# Patient Record
Sex: FEMALE | Race: OTHER | Employment: UNEMPLOYED | URBAN - METROPOLITAN AREA
[De-identification: names, ages, dates, MRNs, and addresses within clinical notes are randomized per-mention and may not be internally consistent; named-entity substitution may affect disease eponyms.]

---

## 2020-08-05 ENCOUNTER — VIRTUAL VISIT (OUTPATIENT)
Dept: FAMILY MEDICINE CLINIC | Age: 55
End: 2020-08-05

## 2020-08-05 DIAGNOSIS — Z11.3 ROUTINE SCREENING FOR STI (SEXUALLY TRANSMITTED INFECTION): ICD-10-CM

## 2020-08-05 DIAGNOSIS — G89.4 CHRONIC PAIN SYNDROME: ICD-10-CM

## 2020-08-05 DIAGNOSIS — M06.9 RHEUMATOID ARTHRITIS, INVOLVING UNSPECIFIED SITE, UNSPECIFIED RHEUMATOID FACTOR PRESENCE: ICD-10-CM

## 2020-08-05 DIAGNOSIS — E03.9 ACQUIRED HYPOTHYROIDISM: Primary | ICD-10-CM

## 2020-08-05 DIAGNOSIS — R56.9 SEIZURES (HCC): ICD-10-CM

## 2020-08-05 DIAGNOSIS — F31.70 BIPOLAR DISORDER IN PARTIAL REMISSION, MOST RECENT EPISODE UNSPECIFIED TYPE (HCC): ICD-10-CM

## 2020-08-05 DIAGNOSIS — G47.33 OSA (OBSTRUCTIVE SLEEP APNEA): ICD-10-CM

## 2020-08-05 DIAGNOSIS — H91.93 BILATERAL HEARING LOSS, UNSPECIFIED HEARING LOSS TYPE: ICD-10-CM

## 2020-08-05 DIAGNOSIS — E78.5 HYPERLIPIDEMIA, UNSPECIFIED HYPERLIPIDEMIA TYPE: ICD-10-CM

## 2020-08-05 DIAGNOSIS — F17.200 CURRENT EVERY DAY SMOKER: ICD-10-CM

## 2020-08-05 DIAGNOSIS — D64.9 ANEMIA, UNSPECIFIED TYPE: ICD-10-CM

## 2020-08-05 DIAGNOSIS — J45.40 MODERATE PERSISTENT ASTHMA WITHOUT COMPLICATION: ICD-10-CM

## 2020-08-05 DIAGNOSIS — R00.1 BRADYCARDIA: ICD-10-CM

## 2020-08-05 PROBLEM — I00 RHEUMATIC ARTERITIS: Status: ACTIVE | Noted: 2020-08-05

## 2020-08-05 PROCEDURE — 99203 OFFICE O/P NEW LOW 30 MIN: CPT | Performed by: FAMILY MEDICINE

## 2020-08-05 RX ORDER — LEVOTHYROXINE SODIUM 137 UG/1
137 TABLET ORAL
COMMUNITY
End: 2020-08-26 | Stop reason: SDUPTHER

## 2020-08-05 RX ORDER — OXYCODONE AND ACETAMINOPHEN 10; 325 MG/1; MG/1
TABLET ORAL
COMMUNITY
End: 2020-08-26 | Stop reason: SDUPTHER

## 2020-08-05 RX ORDER — ATORVASTATIN CALCIUM 40 MG/1
TABLET, FILM COATED ORAL DAILY
COMMUNITY
End: 2020-08-26 | Stop reason: SDUPTHER

## 2020-08-05 RX ORDER — DIVALPROEX SODIUM 500 MG/1
1000 TABLET, DELAYED RELEASE ORAL 2 TIMES DAILY
COMMUNITY
End: 2020-08-26 | Stop reason: SDUPTHER

## 2020-08-05 RX ORDER — ARIPIPRAZOLE 15 MG/1
15 TABLET ORAL DAILY
COMMUNITY
End: 2020-08-26 | Stop reason: SDUPTHER
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
## 2020-08-05 NOTE — Clinical Note
If possible, can this patient please be scheduled for an in office visit with me soon? Hopefully the next 1-2 weeks. Will need 30 minutes. Thanks!

## 2020-08-26 ENCOUNTER — OFFICE VISIT (OUTPATIENT)
Dept: FAMILY MEDICINE CLINIC | Age: 55
End: 2020-08-26

## 2020-08-26 VITALS
DIASTOLIC BLOOD PRESSURE: 74 MMHG | SYSTOLIC BLOOD PRESSURE: 146 MMHG | RESPIRATION RATE: 20 BRPM | BODY MASS INDEX: 28.38 KG/M2 | WEIGHT: 166.25 LBS | HEART RATE: 77 BPM | TEMPERATURE: 98.7 F | OXYGEN SATURATION: 100 % | HEIGHT: 64 IN

## 2020-08-26 DIAGNOSIS — E78.5 HYPERLIPIDEMIA, UNSPECIFIED HYPERLIPIDEMIA TYPE: ICD-10-CM

## 2020-08-26 DIAGNOSIS — G89.4 CHRONIC PAIN SYNDROME: ICD-10-CM

## 2020-08-26 DIAGNOSIS — J45.40 MODERATE PERSISTENT ASTHMA WITHOUT COMPLICATION: ICD-10-CM

## 2020-08-26 DIAGNOSIS — E03.9 ACQUIRED HYPOTHYROIDISM: Primary | ICD-10-CM

## 2020-08-26 DIAGNOSIS — R56.9 SEIZURES (HCC): ICD-10-CM

## 2020-08-26 DIAGNOSIS — K21.9 GASTROESOPHAGEAL REFLUX DISEASE, ESOPHAGITIS PRESENCE NOT SPECIFIED: ICD-10-CM

## 2020-08-26 DIAGNOSIS — Z12.39 BREAST CANCER SCREENING: ICD-10-CM

## 2020-08-26 DIAGNOSIS — F31.70 BIPOLAR DISORDER IN PARTIAL REMISSION, MOST RECENT EPISODE UNSPECIFIED TYPE (HCC): ICD-10-CM

## 2020-08-26 DIAGNOSIS — F17.200 CURRENT EVERY DAY SMOKER: ICD-10-CM

## 2020-08-26 LAB
ALBUMIN SERPL-MCNC: 3.6 G/DL (ref 3.5–5)
ALBUMIN/GLOB SERPL: 0.9 {RATIO} (ref 1.1–2.2)
ALP SERPL-CCNC: 97 U/L (ref 45–117)
ALT SERPL-CCNC: 14 U/L (ref 12–78)
ANION GAP SERPL CALC-SCNC: 3 MMOL/L (ref 5–15)
AST SERPL-CCNC: 9 U/L (ref 15–37)
BILIRUB SERPL-MCNC: 0.4 MG/DL (ref 0.2–1)
BUN SERPL-MCNC: 7 MG/DL (ref 6–20)
BUN/CREAT SERPL: 10 (ref 12–20)
CALCIUM SERPL-MCNC: 9.9 MG/DL (ref 8.5–10.1)
CHLORIDE SERPL-SCNC: 107 MMOL/L (ref 97–108)
CHOLEST SERPL-MCNC: 269 MG/DL
CO2 SERPL-SCNC: 31 MMOL/L (ref 21–32)
CREAT SERPL-MCNC: 0.72 MG/DL (ref 0.55–1.02)
ERYTHROCYTE [DISTWIDTH] IN BLOOD BY AUTOMATED COUNT: 15.3 % (ref 11.5–14.5)
GLOBULIN SER CALC-MCNC: 4.2 G/DL (ref 2–4)
GLUCOSE SERPL-MCNC: 72 MG/DL (ref 65–100)
HCT VFR BLD AUTO: 41.7 % (ref 35–47)
HDLC SERPL-MCNC: 44 MG/DL
HDLC SERPL: 6.1 {RATIO} (ref 0–5)
HGB BLD-MCNC: 12.6 G/DL (ref 11.5–16)
LDLC SERPL CALC-MCNC: 166.2 MG/DL (ref 0–100)
LIPID PROFILE,FLP: ABNORMAL
MCH RBC QN AUTO: 27.7 PG (ref 26–34)
MCHC RBC AUTO-ENTMCNC: 30.2 G/DL (ref 30–36.5)
MCV RBC AUTO: 91.6 FL (ref 80–99)
NRBC # BLD: 0 K/UL (ref 0–0.01)
NRBC BLD-RTO: 0 PER 100 WBC
PLATELET # BLD AUTO: 332 K/UL (ref 150–400)
PMV BLD AUTO: 11 FL (ref 8.9–12.9)
POTASSIUM SERPL-SCNC: 3.9 MMOL/L (ref 3.5–5.1)
PROT SERPL-MCNC: 7.8 G/DL (ref 6.4–8.2)
RBC # BLD AUTO: 4.55 M/UL (ref 3.8–5.2)
SODIUM SERPL-SCNC: 141 MMOL/L (ref 136–145)
TRIGL SERPL-MCNC: 294 MG/DL (ref ?–150)
TSH SERPL DL<=0.05 MIU/L-ACNC: 1.11 UIU/ML (ref 0.36–3.74)
VLDLC SERPL CALC-MCNC: 58.8 MG/DL
WBC # BLD AUTO: 12.9 K/UL (ref 3.6–11)

## 2020-08-26 PROCEDURE — 99214 OFFICE O/P EST MOD 30 MIN: CPT | Performed by: FAMILY MEDICINE

## 2020-08-26 RX ORDER — OXYCODONE AND ACETAMINOPHEN 10; 325 MG/1; MG/1
1 TABLET ORAL
Qty: 21 TAB | Refills: 0 | Status: SHIPPED | OUTPATIENT
Start: 2020-08-26 | End: 2020-09-02

## 2020-08-26 RX ORDER — ARIPIPRAZOLE 15 MG/1
15 TABLET ORAL DAILY
Qty: 90 TAB | Refills: 1 | Status: SHIPPED | OUTPATIENT
Start: 2020-08-26 | End: 2020-12-08 | Stop reason: SDUPTHER

## 2020-08-26 RX ORDER — DIVALPROEX SODIUM 500 MG/1
1000 TABLET, DELAYED RELEASE ORAL 2 TIMES DAILY
Qty: 120 TAB | Refills: 2 | Status: SHIPPED | OUTPATIENT
Start: 2020-08-26 | End: 2020-09-14 | Stop reason: SDUPTHER

## 2020-08-26 RX ORDER — TIZANIDINE 4 MG/1
TABLET ORAL
Qty: 60 TAB | Refills: 0 | Status: SHIPPED | OUTPATIENT
Start: 2020-08-26 | End: 2020-09-28

## 2020-08-26 RX ORDER — ATORVASTATIN CALCIUM 80 MG/1
40 TABLET, FILM COATED ORAL DAILY
Qty: 90 TAB | Refills: 3 | Status: SHIPPED | OUTPATIENT
Start: 2020-08-26 | End: 2021-04-25 | Stop reason: SDUPTHER

## 2020-08-26 RX ORDER — NALOXONE HYDROCHLORIDE 2 MG/.4ML
2 INJECTION, SOLUTION INTRAMUSCULAR; SUBCUTANEOUS
Qty: 1 DEVICE | Refills: 0 | Status: SHIPPED | OUTPATIENT
Start: 2020-08-26 | End: 2020-08-26

## 2020-08-26 RX ORDER — LEVOTHYROXINE SODIUM 137 UG/1
137 TABLET ORAL
Qty: 90 TAB | Refills: 1 | Status: SHIPPED | OUTPATIENT
Start: 2020-08-26 | End: 2021-04-25 | Stop reason: SDUPTHER

## 2020-08-26 RX ORDER — MONTELUKAST SODIUM 10 MG/1
10 TABLET ORAL DAILY
Qty: 90 TAB | Refills: 1 | Status: SHIPPED | OUTPATIENT
Start: 2020-08-26 | End: 2021-04-25 | Stop reason: SDUPTHER

## 2020-08-26 RX ORDER — PANTOPRAZOLE SODIUM 40 MG/1
40 TABLET, DELAYED RELEASE ORAL DAILY
Qty: 90 TAB | Refills: 1 | Status: SHIPPED | OUTPATIENT
Start: 2020-08-26 | End: 2021-04-25 | Stop reason: SDUPTHER

## 2020-08-26 RX ORDER — TOPIRAMATE 100 MG/1
100 TABLET, FILM COATED ORAL DAILY
Qty: 90 TAB | Refills: 1 | Status: SHIPPED | OUTPATIENT
Start: 2020-08-26 | End: 2021-04-25 | Stop reason: SDUPTHER

## 2020-08-26 NOTE — PROGRESS NOTES
Chief Complaint   Patient presents with   [address]     Patient presents to establish with physician; also medication refills. Vitals:    08/26/20 1009   BP: 146/74   BP 1 Location: Left arm   BP Patient Position: Sitting   Pulse: 77   Resp: 20   Temp: 98.7 °F (37.1 °C)   TempSrc: Temporal   SpO2: 100%   Weight: 166 lb 4 oz (75.4 kg)   Height: 5' 4\" (1.626 m)       1. Have you been to the ER, urgent care clinic since your last visit? Hospitalized since your last visit? No, this is her first visit here. 2. Have you seen or consulted any other health care providers outside of the [address] [place] [address] since your last visit? Include any pap smears or colon screening.  No

## 2020-08-26 NOTE — PROGRESS NOTES
History of Present Illness:     Chief Complaint   Patient presents with   [address]     Patient presents to establish with physician; also medication refills. Audelia Parkinson is a 48 y.o. female follow up for establishment of care. Problem list was reviewed extensively at prior VV and updated in chart. She reports she is all out of her medication and needs refills on everything. Reports she had issues with chest pain. She had a cardiology evaluation with stress test and said everything was normal. No records here. PMH (REVIEWED):  Past Medical History:   Diagnosis Date    Hypothyroid     Psychotic disorder ([facility]        Current Medications/Allergies (REVIEWED):     Current Outpatient Medications on File Prior to Visit   Medication Sig Dispense Refill    oxyCODONE ER (XTAMPZA ER) 9 mg capsule Take 9 mg by mouth every twelve (12) hours.  oxyCODONE-acetaminophen (Percocet)  mg per tablet Take  by mouth every six (6) hours as needed for Pain. No current facility-administered medications on file prior to visit.         Allergies   Allergen Reactions    Ibuprofen Swelling         Review of Systems:     Denies fever, chills, sweats  Denies chest pain, YOST, palpitations, LE edema  Denies cough, sputum production, SOB, pleuritic chest pain, wheezing  Denies n/v/d, constipation, melena, blood in stool  Denies numbness/ tingling/ weakness in extremities  +Pain all over body      Objective:     Vitals:    08/26/20 1009   BP: 146/74   Pulse: 77   Resp: 20   Temp: 98.7 °F (37.1 °C)   TempSrc: Temporal   SpO2: 100%   Weight: 166 lb 4 oz (75.4 kg)   Height: 5' 4\" (1.626 m)       Physical Exam:  General appearance - alert, well appearing, and in no distress  Mental status - alert, oriented to person, place, and time, depressed mood, affect appropriate to mood  Chest - clear to auscultation, no wheezes, rales or rhonchi, symmetric air entry  Heart - normal rate, regular rhythm, normal S1, S2, no murmurs, rubs, clicks or gallops  Neurological - alert, oriented, normal speech, no focal findings or movement disorder noted    Recent Labs:  No results found for this or any previous visit (from the past 12 hour(s)). Assessment and Plan:       ICD-10-CM ICD-9-CM    1. Acquired hypothyroidism  E03.9 244.9 CBC W/O DIFF      levothyroxine (SYNTHROID) 137 mcg tablet      TSH 3RD GENERATION   2. Seizures (HCC)  R56.9 780.39 divalproex DR (Depakote) 500 mg tablet      topiramate (TOPAMAX) 100 mg tablet   3. Bipolar disorder in partial remission, most recent episode unspecified type ([facility]  F31.70 296.80 ARIPiprazole (Abilify) 15 mg tablet      divalproex DR (Depakote) 500 mg tablet   4. Hyperlipidemia, unspecified hyperlipidemia type  E78.5 272.4 LIPID PANEL      METABOLIC PANEL, COMPREHENSIVE      atorvastatin (LIPITOR) 80 mg tablet   5. Moderate persistent asthma without complication  Q18.54 883.81 montelukast (SINGULAIR) 10 mg tablet   6. Current every day smoker  F17.200 305.1    7. Chronic pain syndrome  G89.4 338.4 tiZANidine (ZANAFLEX) 4 mg tablet   8. Breast cancer screening  Z12.39 V76.10 [facility] MAMMO BI SCREENING INCL CAD   9. Gastroesophageal reflux disease, esophagitis presence not specified  K21.9 530.81 pantoprazole (PROTONIX) 40 mg tablet     Refilled medications    Scheduled pain management appointment   Reviewed ; negative in [place] but pt recently moved from [place]  Provided with Narcan     This is a chronic problem that is not changed. Per review of available records and patients , there are not sign of overuse, misuse, diversion, or concerning side effects. Today we reviewed: the risk of overdose, addiction, and dependency alternative treatment options including non-narcotic modalities referral to Pain Management   The following changes were made to the patients current treatment plan: Referral to Pain Management  nothing, medications refilled. Follow up: 4 weeks.  Will contact patient about pain management visit. Idalmis Montilla MD    Time-based coding, delete if not needed: I spent at least 25 minutes with this established patient, and >50% of the time was spent counseling and/or coordinating care regarding arranging Pain Management visit, counseling on pain meds, review of chart and medications    We discussed the expected course, resolution and complications of the diagnosis(es) in detail. Medication risks, benefits, costs, interactions, and alternatives were discussed as indicated. I advised her to contact the office if her condition worsens, changes or fails to improve as anticipated. She expressed understanding with the diagnosis(es) and plan.

## 2020-08-26 NOTE — PATIENT INSTRUCTIONS

## 2020-08-27 NOTE — PROGRESS NOTES
Lipids elevated, Estimated ASCVD risk 13.3%  Recent non compliance with statin, refilled yesterda  Consider increased dose if pt has indeed been compliant  Remaining labs stable  Plan to rpt labs with lipid panel in 2-3 mo  Called and notified pt of result

## 2020-09-09 ENCOUNTER — HOSPITAL ENCOUNTER (OUTPATIENT)
Dept: MAMMOGRAPHY | Age: 55
Discharge: HOME | End: 2020-09-09
Attending: FAMILY MEDICINE
Payer: MEDICAID

## 2020-09-09 DIAGNOSIS — Z12.39 BREAST CANCER SCREENING: ICD-10-CM

## 2020-09-09 PROCEDURE — 77067 SCR MAMMO BI INCL CAD: CPT

## 2020-09-14 ENCOUNTER — TELEPHONE (OUTPATIENT)
Dept: FAMILY MEDICINE CLINIC | Age: 55
End: 2020-09-14

## 2020-09-14 DIAGNOSIS — R56.9 SEIZURES (HCC): ICD-10-CM

## 2020-09-14 DIAGNOSIS — F31.70 BIPOLAR DISORDER IN PARTIAL REMISSION, MOST RECENT EPISODE UNSPECIFIED TYPE (HCC): ICD-10-CM

## 2020-09-14 RX ORDER — DIVALPROEX SODIUM 500 MG/1
1000 TABLET, DELAYED RELEASE ORAL 2 TIMES DAILY
Qty: 120 TAB | Refills: 2 | Status: SHIPPED | OUTPATIENT
Start: 2020-09-14 | End: 2020-12-08 | Stop reason: SDUPTHER

## 2020-09-14 NOTE — TELEPHONE ENCOUNTER
Pt referred to pain management at [address] [place] [address]. Willing to fill meds once date for pain management visit confirmed. Will have nursing call to inquire.

## 2020-09-14 NOTE — TELEPHONE ENCOUNTER
----- Message from Austin Hua sent at 9/10/2020  9:52 AM EDT -----  Regarding: Dr. Etienne  telephone  Caller's first and last name and relationship to patient (if not the patient): self. Best contact number: [phone]  Preferred date and time: Cannot have week of 10/11-10/17. Reason for appointment: Pt was told Sophie Brown MD would be scheduling pt appts listed below. Details to clarify the request: Pt saw Sophie Brown 08/26, was told at appt she would have appts scheduled for Pain Management, Rheumatology, Psychiatric, ENT, and a new Sleep Apnea Machine. Emphasized having her Pain Management and Rheumatology ASAP. Also needs f/up scheduled w/ Dr Sophie Brown.

## 2020-09-14 NOTE — TELEPHONE ENCOUNTER
[phone]  Patient called for these refills as she will not have enough medication to last until she can get her referral appointments made.       Percocet--has four pills left and this medication is not on the current list.    Kathie Collins she also needs beto

## 2020-09-15 ENCOUNTER — TELEPHONE (OUTPATIENT)
Dept: FAMILY MEDICINE CLINIC | Age: 55
End: 2020-09-15

## 2020-09-15 DIAGNOSIS — G89.4 CHRONIC PAIN SYNDROME: Primary | ICD-10-CM

## 2020-09-15 DIAGNOSIS — F11.90 CHRONIC, CONTINUOUS USE OF OPIOIDS: ICD-10-CM

## 2020-09-15 NOTE — TELEPHONE ENCOUNTER
Appt scheduled Sept 22th [facility] ( [facility]) [facility], [facility] 
 
[phone] Pt aware

## 2020-09-17 RX ORDER — OXYCODONE AND ACETAMINOPHEN 10; 325 MG/1; MG/1
1 TABLET ORAL
Qty: 21 TAB | Refills: 0 | Status: SHIPPED | OUTPATIENT
Start: 2020-09-17 | End: 2020-09-24

## 2020-09-17 NOTE — TELEPHONE ENCOUNTER
Confirmed with nursing that patient has an appointment with pain management on September 22nd. Will refill pain meds for 7 days. Reviewed . Appears that pt filled the Percocet script but not the Oxycodone ER. Will have pt called with information.   
 
Roxy Rose MD

## 2020-09-18 ENCOUNTER — TELEPHONE (OUTPATIENT)
Dept: FAMILY MEDICINE CLINIC | Age: 55
End: 2020-09-18

## 2020-09-18 NOTE — TELEPHONE ENCOUNTER
----- Message from Gabriella Powers sent at 9/16/2020  3:38 PM EDT -----  Regarding: Dr. Kaba Pac (if not patient): Self  Relationship of caller (if not patient):  Best contact number(s): [phone]  Name of medication and dosage if known: \"tadalafil\" 5 mg qty 90  Is patient out of this medication (yes/no): yes  Pharmacy name: [facility] listed in chart? (yes/no): Yes  Pharmacy phone number: [phone] option 2   Date of last visit: 03/17/2020   Details to clarify the request: pt requesting a prior authorization Rx through [employer] [id number] option 2 , pharmacy stated they have requested prior authorization 2 times and have not heard a response, for \"tadalafil\" 5 mg qty 90, pt requests a callback at [phone] regarding prior authorization status

## 2020-09-22 ENCOUNTER — TELEPHONE (OUTPATIENT)
Dept: FAMILY MEDICINE CLINIC | Age: 55
End: 2020-09-22

## 2020-09-22 NOTE — TELEPHONE ENCOUNTER
Patient appointment 9/30/2020 with   Dr. Lg Kamara, patient wants to know does she need to keep the appointment?

## 2020-09-24 NOTE — TELEPHONE ENCOUNTER
Advised patient per physician instructions.     Patient said thank you Dr. Darell Howard and that she is back on the right track, she saw the pain specialist, psychiatrist and had a mammogram.

## 2020-09-27 DIAGNOSIS — G89.4 CHRONIC PAIN SYNDROME: ICD-10-CM

## 2020-09-28 RX ORDER — TIZANIDINE 4 MG/1
TABLET ORAL
Qty: 60 TAB | Refills: 0 | Status: SHIPPED | OUTPATIENT
Start: 2020-09-28 | End: 2020-10-30

## 2020-10-06 ENCOUNTER — VIRTUAL VISIT (OUTPATIENT)
Dept: SLEEP MEDICINE | Age: 55
End: 2020-10-06
Payer: COMMERCIAL

## 2020-10-06 DIAGNOSIS — G47.33 OSA (OBSTRUCTIVE SLEEP APNEA): Primary | ICD-10-CM

## 2020-10-06 PROCEDURE — 99204 OFFICE O/P NEW MOD 45 MIN: CPT | Performed by: SPECIALIST

## 2020-10-06 NOTE — PATIENT INSTRUCTIONS

## 2020-10-06 NOTE — PROGRESS NOTES
Unreliably seeing you today                     [address] [place]. [place], [address]  Tel.  [phone]  Fax. [address]  [place], [address] [facility]  Tel.  [phone]  Fax. [phone] [address] [facility] Melissa [place]ribe   Tel.  [phone]  Fax. [phone]     Mukesh Mccormick is a 48 y.o. female who was seen by synchronous (real-time) audio-video technology on 10/6/2020. Consent:  She and/or her healthcare decision maker is aware that this patient-initiated Telehealth encounter is a billable service, with coverage as determined by her insurance carrier. She is aware that she may receive a bill and has provided verbal consent to proceed: Yes    I was in the office while conducting this encounter. Chief Complaint       No chief complaint on file. HPI      Mukesh Mccormick is 48 y.o. female seen for evaluation of a sleep disorder. She has a history of snoring, nocturnal awakening and daytime fatigue. She states that she was diagnosed with sleep apnea at [facility] which was started on CPAP. She is unable to provide more details in this regard. She states that she was able to use CPAP only 3-4 times during the week. She noted multiple problems including tolerating the CPAP pressure, headgear and mask. She notes retiring at 910 PM and awakening at 5:55 AM.  She will awaken several times during the night. She notes snoring, associated apnea, sitting up in bed while still asleep, leg twitching/kicking, waking with a gasp or snort and nightmares. She denies sleep walking or sleep talking, bruxism or nocturnal incontinence, abnormal arm movements, hypnagogic hallucinations, sleep paralysis or cataplexy. She notes she may doze if she is seated and inactive such as reading or watching TV.       Allergies   Allergen Reactions    Ibuprofen Swelling       Current Outpatient Medications   Medication Sig Dispense Refill    tiZANidine (ZANAFLEX) 4 mg tablet TAKE 1 TABLET BY MOUTH TWICE DAILY AS NEEDED FOR MUSCLE SPASM 60 Tab 0    divalproex DR (Depakote) 500 mg tablet Take 2 Tabs by mouth two (2) times a day for 90 days. 120 Tab 2    ARIPiprazole (Abilify) 15 mg tablet Take 1 Tab by mouth daily. 90 Tab 1    atorvastatin (LIPITOR) 80 mg tablet Take 0.5 Tabs by mouth daily. 90 Tab 3    levothyroxine (SYNTHROID) 137 mcg tablet Take 137 mcg by mouth Daily (before breakfast). 90 Tab 1    pantoprazole (PROTONIX) 40 mg tablet Take 1 Tab by mouth daily. 90 Tab 1    topiramate (TOPAMAX) 100 mg tablet Take 1 Tab by mouth daily. 90 Tab 1    montelukast (SINGULAIR) 10 mg tablet Take 1 Tab by mouth daily. 90 Tab 1        She  has a past medical history of Hypothyroid and Psychotic disorder ([address] She  has a past surgical history that includes hx carpal tunnel release; hx tumor removal; hx hysterectomy; and hx bladder suspension. She family history includes Diabetes in her mother; Heart Attack (age of onset: 48) in her father; Heart Attack (age of onset: 54) in her mother; Hypertension in her mother; Psychiatric Disorder in her father; Seizures in her father. She  reports that she has been smoking cigarettes. She has never used smokeless tobacco. She reports current alcohol use. She reports current drug use. Frequency: 7.00 times per week. Drug: Marijuana. Review of Systems:  ROS    [place] Sleepiness Scale: 7  Modified FOSQ: 13.5    Due to this being a telemedicine evaluation, certain elements of the physical examination are unable to be assessed. Objective:     Height: 5'4\"   Weight: 166 lb  BMI: 28.5  General:   Conversant, cooperative   Eyes:  no nystagmus   Oropharynx:  tongue scallope                   Skin:   no obvious rashes   Neuro:  Speech fluent, face symmetrical, tongue movement normal   Psych:  Normal affect,  normal countenance        Assessment:       ICD-10-CM ICD-9-CM    1.  AIDE (obstructive sleep apnea) G47.33 327.23        History of sleep disorder breathing. Copy of the sleep study and [facility]. Those will be reviewed with the patient. Plan:     No orders of the defined types were placed in this encounter. * Patient has a history and examination consistent with the diagnosis of sleep apnea. * She was provided information on sleep apnea including corresponding risk factors and the importance of proper treatment. * Treatment options if indicated were reviewed today. Instructions:  o The patient would benefit from weight reduction measures. o Do not engage in activities requiring a normal degree of alertness if fatigue is present. o The patient understands that untreated or undertreated sleep apnea could impair judgement and the ability to function normally during the day.  o Call or return if symptoms worsen or persist.          Ray Pro MD, [facility]  Electronically signed 10/06/20     Pursuant to the emergency declaration under the [facility], [facility] waiver authority and the [employer] Act, this Virtual  Visit was conducted, with patient's consent, to reduce the patient's risk of exposure to COVID-19 and provide continuity of care for an established patient. Services were provided through a video synchronous discussion virtually to substitute for in-person clinic visit. Wili Priest MD     Visit duration: 14 minutes      This note was created using voice recognition software. Despite editing, there may be syntax errors. This note will not be viewable in [address]

## 2020-10-09 NOTE — PROGRESS NOTES
Mammogram with: Negative. No mammographic evidence of malignancy. The next mammogram is indicated in one year.

## 2020-10-13 ENCOUNTER — TELEPHONE (OUTPATIENT)
Dept: SLEEP MEDICINE | Age: 55
End: 2020-10-13

## 2020-10-13 DIAGNOSIS — G47.33 OSA (OBSTRUCTIVE SLEEP APNEA): Primary | ICD-10-CM

## 2020-10-13 NOTE — TELEPHONE ENCOUNTER
Records from [facility] demonstrated response to CPAP of 8 and 10 cm. Diagnostic portion not included. Order entered for HSAT.

## 2020-10-30 DIAGNOSIS — G89.4 CHRONIC PAIN SYNDROME: ICD-10-CM

## 2020-10-30 RX ORDER — TIZANIDINE 4 MG/1
TABLET ORAL
Qty: 60 TAB | Refills: 0 | Status: SHIPPED | OUTPATIENT
Start: 2020-10-30 | End: 2021-04-25 | Stop reason: SDUPTHER

## 2020-11-03 ENCOUNTER — TELEPHONE (OUTPATIENT)
Dept: FAMILY MEDICINE CLINIC | Age: 55
End: 2020-11-03

## 2020-11-03 DIAGNOSIS — M25.531 RIGHT WRIST PAIN: Primary | ICD-10-CM

## 2020-11-03 RX ORDER — ARM BRACE
EACH MISCELLANEOUS
Qty: 1 EACH | Refills: 0 | Status: SHIPPED | OUTPATIENT
Start: 2020-11-03 | End: 2021-09-03

## 2020-11-03 NOTE — TELEPHONE ENCOUNTER
Per pt call, asking if you can prescribe RX for brace for right wrist due to her carpal tunnel. She states the cold is making her wrist hurt.       Please call/ [phone] (

## 2020-11-05 ENCOUNTER — APPOINTMENT (OUTPATIENT)
Dept: GENERAL RADIOLOGY | Age: 55
End: 2020-11-05
Attending: PHYSICIAN ASSISTANT
Payer: COMMERCIAL

## 2020-11-05 ENCOUNTER — HOSPITAL ENCOUNTER (EMERGENCY)
Age: 55
Discharge: HOME | End: 2020-11-05
Attending: EMERGENCY MEDICINE
Payer: COMMERCIAL

## 2020-11-05 VITALS
OXYGEN SATURATION: 98 % | RESPIRATION RATE: 18 BRPM | DIASTOLIC BLOOD PRESSURE: 74 MMHG | HEIGHT: 65 IN | SYSTOLIC BLOOD PRESSURE: 149 MMHG | WEIGHT: 165 LBS | HEART RATE: 69 BPM | BODY MASS INDEX: 27.49 KG/M2 | TEMPERATURE: 97.7 F

## 2020-11-05 DIAGNOSIS — M79.89 PAIN AND SWELLING OF TOE OF LEFT FOOT: Primary | ICD-10-CM

## 2020-11-05 DIAGNOSIS — M79.675 PAIN AND SWELLING OF TOE OF LEFT FOOT: Primary | ICD-10-CM

## 2020-11-05 PROCEDURE — 99282 EMERGENCY DEPT VISIT SF MDM: CPT

## 2020-11-05 PROCEDURE — 73630 X-RAY EXAM OF FOOT: CPT

## 2020-11-05 NOTE — ED PROVIDER NOTES
28-year-old female with past medical history of hypothyroidism and psychotic disorder presents to ED due to left toe pain after \"stubbing my toes on the curb 2 days ago \". Patient states at first the pain was moderate but woke her out of her sleep last night and was throbbing. Patient takes medication for chronic pain and did take 1 Percocet which helped her pain. She is allergic to ibuprofen. Patient states that it is the second toe of her left foot, looks kind of swollen and red today. Denies pain of any other toes, denies other foot pain, ankle pain, knee pain. Patient did not fall at the time that she stubbed her toe.            Past Medical History:   Diagnosis Date    Hypothyroid     Psychotic disorder ([facility] [address]        Past Surgical History:   Procedure Laterality Date    HX BLADDER SUSPENSION      HX CARPAL TUNNEL RELEASE      x 3 on right hand    HX HYSTERECTOMY      No idea what was left behind, for fibroids    HX TUMOR REMOVAL      As a child, right side of neck         Family History:   Problem Relation Age of Onset    Heart Attack Mother 54    Diabetes Mother     Hypertension Mother     Heart Attack Father 48    Seizures Father     Psychiatric Disorder Father        Social History     Socioeconomic History    Marital status: SINGLE     Spouse name: Not on file    Number of children: Not on file    Years of education: Not on file    Highest education level: Not on file   Occupational History    Not on file   Social Needs    Financial resource strain: Not on file    Food insecurity     Worry: Not on file     Inability: Not on file    Transportation needs     Medical: Not on file     Non-medical: Not on file   Tobacco Use    Smoking status: Current Every Day Smoker     Types: Cigarettes    Smokeless tobacco: Never Used   Substance and Sexual Activity    Alcohol use: Yes     Frequency: Monthly or less     Drinks per session: 1 or 2    Drug use: Yes     Frequency: 7.0 times per week Types: Marijuana    Sexual activity: Yes     Partners: Female, Male     Birth control/protection: Condom   Lifestyle    Physical activity     Days per week: Not on file     Minutes per session: Not on file    Stress: Not on file   Relationships    Social connections     Talks on phone: Not on file     Gets together: Not on file     Attends [employer] service: Not on file     Active member of club or organization: Not on file     Attends meetings of clubs or organizations: Not on file     Relationship status: Not on file    Intimate partner violence     Fear of current or ex partner: Not on file     Emotionally abused: Not on file     Physically abused: Not on file     Forced sexual activity: Not on file   Other Topics Concern    Not on file   Social History Narrative    Not on file         ALLERGIES: Ibuprofen    Review of Systems   Constitutional: Negative for fever. HENT: Negative for congestion and sore throat. Respiratory: Negative for cough and shortness of breath. Cardiovascular: Negative for chest pain. Gastrointestinal: Negative for nausea and vomiting. Genitourinary: Negative for dysuria. Musculoskeletal: Positive for arthralgias (left second toe pain). Negative for myalgias. Skin: Negative for rash. Neurological: Negative for dizziness and weakness. Vitals:    11/05/20 0900   BP: (!) 149/74   Pulse: 69   Resp: 18   Temp: 97.7 °F (36.5 °C)   SpO2: 98%   Weight: 74.8 kg (165 lb)   Height: 5' 5\" (1.651 m)            Physical Exam  Vitals signs and nursing note reviewed. Constitutional:       General: She is not in acute distress. HENT:      Head: Normocephalic. Nose: Nose normal.      Mouth/Throat:      Mouth: Mucous membranes are moist.   Eyes:      Extraocular Movements: Extraocular movements intact. Neck:      Musculoskeletal: Normal range of motion. Pulmonary:      Effort: Pulmonary effort is normal. No respiratory distress.    Musculoskeletal: Feet:    Skin:     General: Skin is dry. Findings: No rash. Neurological:      Mental Status: She is alert and oriented to person, place, and time. Psychiatric:         Mood and Affect: Mood normal.        Medications - No data to display  Labs Reviewed - No data to display  XR FOOT LT MIN 3 V   Final Result   IMPRESSION: No acute abnormality. MDM  Number of Diagnoses or Management Options  Pain and swelling of toe of left foot:      Amount and/or Complexity of Data Reviewed  Tests in the radiology section of CPT®: reviewed      Differential diagnosis includes acute fracture, ligament sprain, neurovascular injury    Xray reveals no acute fracture, patient has difficulty ambulating due to pain and is limping significantly.  Will provided with post-op shoe to assist with ambulation    Discussed care with ice and ortho follow-up as needed    Return precautions reviewed           Procedures    Darwin Merlos PA-C  11/5/2020

## 2020-11-05 NOTE — DISCHARGE INSTRUCTIONS
Patient Education     Use ice, 20 minutes on followed by 20 minutes off, throughout the day to help with pain and swelling. Wear the post-op shoe until you can walk without limping to prevent secondary injury. Follow-up with orthopedics if your pain does not improve. Bones of the Foot: Anatomy Sketch    Current as of: March 2, 2020               Content Version: 12.6  © 5541-0502 [employer], [email or web address]. Care instructions adapted under license by [employer] (which disclaims liability or warranty for this information). If you have questions about a medical condition or this instruction, always ask your healthcare professional. [place] [address] any warranty or liability for your use of this information.

## 2020-11-06 ENCOUNTER — PATIENT OUTREACH (OUTPATIENT)
Dept: CASE MANAGEMENT | Age: 55
End: 2020-11-06

## 2020-11-06 NOTE — PROGRESS NOTES
Patient contacted regarding recent discharge and COVID-19 risk. Discussed COVID-19 related testing which was not done at this time. Test results were not done. Patient informed of results, if available?     Care Transition Nurse/ Ambulatory Care Manager/ LPN Care Coordinator contacted the patient by telephone to perform post discharge assessment. Verified name and  with patient as identifiers. Patient has following risk factors of: asthma. CTN/ACM/LPN reviewed discharge instructions, medical action plan and red flags related to discharge diagnosis. Reviewed and educated them on any new and changed medications related to discharge diagnosis. Advised obtaining a 90-day supply of all daily and as-needed medications. Advance Care Planning:   Does patient have an Advance Directive: currently not on file; patient declined education    Education provided regarding infection prevention, and signs and symptoms of COVID-19 and when to seek medical attention with patient who verbalized understanding. Discussed exposure protocols and quarantine from [address] you at higher risk for severe illness  and given an opportunity for questions and concerns. The patient agrees to contact the COVID-19 hotline [phone] or PCP office for questions related to their healthcare. CTN/ACM/LPN provided contact information for future reference. From CDC: Are you at higher risk for severe illness?  Wash your hands often.  Avoid close contact (6 feet, which is about two arm lengths) with people who are sick.  Put distance between yourself and other people if COVID-19 is spreading in your community.  Clean and disinfect frequently touched surfaces.  Avoid all cruise travel and non-essential air travel.  Call your healthcare professional if you have concerns about COVID-19 and your underlying condition or if you are sick.     For more information on steps you can take to protect yourself, see CDC's How to Protect Yourself      Patient/family/caregiver given information for [facility] and agrees to enroll no  Patient's preferred e-mail:  na  Patient's preferred phone number: na  Based on Loop alert triggers, patient will be contacted by nurse care manager for worsening symptoms. Plan for follow-up call in 7-14 days based on severity of symptoms and risk factors. Patient denies any s/sx COVID 19 a this time. States Her toe \"is getting better. \"    Patient informs CTN that she is getting ready to go out of town to visit family for 1 month. CTN advised her of COVID 19 precautions while visiting and to wear a mask and social distance 6 feet for 14 days to prevent inadvertent spread of infection. Patient verbalizes understanding.

## 2020-11-16 ENCOUNTER — TELEPHONE (OUTPATIENT)
Dept: FAMILY MEDICINE CLINIC | Age: 55
End: 2020-11-16

## 2020-11-16 NOTE — TELEPHONE ENCOUNTER
----- Message from Armani Howard sent at 11/16/2020  8:07 AM EST -----  Regarding: /Level 1  Level 1/Escalated Issue      Caller's first and last name and relationship (if not the patient): Self       Best contact number(s): [phone]      What are the symptoms:Really Dark/black stool, Dizziness      Transfer successful - yes/no (include outcome): No      Transfer declined - yes/no (include reason): Yes, back line nurse hung up on me when I called at 8:07 am.     Was caller advised to seek appropriate level of care - yes/no: Yes       Details to clarify the request: patient is complaining of Really Dark/black stool and Dizziness, advised patient to seek appropriate medical care, declined, tried to schedule an appointment, declined,  would like a nurse call at [phone].         Armani Howard

## 2020-11-16 NOTE — TELEPHONE ENCOUNTER
Returned call to pt. Pt notes she's out of town which she mentioned to call center. Spoke with nurse Jonathon VASQUEZ for advice about matter. Pt scheduled for VV for 1st available of tomorrow with Dr. Kimberly Mazariegos last morning appt .

## 2020-11-17 ENCOUNTER — VIRTUAL VISIT (OUTPATIENT)
Dept: FAMILY MEDICINE CLINIC | Age: 55
End: 2020-11-17
Payer: COMMERCIAL

## 2020-11-17 DIAGNOSIS — R42 DIZZINESS: ICD-10-CM

## 2020-11-17 DIAGNOSIS — K92.1 MELENA: Primary | ICD-10-CM

## 2020-11-17 PROCEDURE — 99214 OFFICE O/P EST MOD 30 MIN: CPT | Performed by: STUDENT IN AN ORGANIZED HEALTH CARE EDUCATION/TRAINING PROGRAM

## 2020-11-17 NOTE — PROGRESS NOTES
[address] Medicine Residency Attending Addendum:  Dr. Katelyn Clark MD,  the patient and I were not physically present during this encounter. The resident and I are concurrently monitoring the patient care through appropriate telecommunication technology. I discussed the findings, assessment and plan with the resident and agree with the resident's findings and plan as documented in the resident's note.       Oziel Claudio MD

## 2020-11-17 NOTE — PROGRESS NOTES
Harry Frederick  48 y.o. female  1965  [facility] [address]  [id number]   [address]:    Telemedicine Progress Note  Leonor Lopez MD       Encounter Date and Time: November 17, 2020 at 10:22 AM    Consent: Harry Frederick, who was seen by synchronous (real-time) audio-video technology, and/or her healthcare decision maker, is aware that this patient-initiated, Telehealth encounter on 11/17/2020 is a billable service, with coverage as determined by her insurance carrier. She is aware that she may receive a bill and has provided verbal consent to proceed: Yes. No chief complaint on file. History of Present Illness   Harry Frederick is a 48 y.o. female was evaluated by synchronous (real-time) audio-video technology from home, through a secure patient portal.    Patient has a history of hypothyroidism, psychotic disorder, and hemorrhoids. She presents for dark stools and dizziness. Patient is out of town. Reports that she developed dark, formed stools 4 days ago. Has had 2-3  episodes daily. Notes that her stools became black after 2 days but are back to being dark. Reports having bright red blood with wiping but that has resolved. Associated with intermittent, sharp suprapubic pain and dizziness with position changes. Eating and drinking ok. Patient has know history of hemorrhoids and saw GI in [facility] who told her she has gastritis. Has never had colonoscopy or EGD. Denies excessive use of NSAIDs. Denies chest pain, SOB, or palpitations. Review of Systems   Review of Systems   Constitutional: Negative for chills and fever. HENT: Negative for congestion and sore throat. Eyes: Negative for blurred vision and double vision. Respiratory: Negative for cough and shortness of breath. Cardiovascular: Negative for chest pain and palpitations. Gastrointestinal: Positive for abdominal pain. Negative for nausea and vomiting. Dark stool   Genitourinary: Negative for dysuria and urgency. Musculoskeletal: Negative for joint pain and myalgias. Neurological: Positive for dizziness. Negative for focal weakness and headaches. Vitals/Objective:     General: alert, cooperative, no distress   Mental  status: mental status: alert, oriented to person, place, and time, normal mood, behavior, speech, dress, motor activity, and thought processes   Resp: resp: normal effort and no respiratory distress   Neuro: neuro: no gross deficits   Skin: skin: no discoloration or lesions of concern on visible areas   Due to this being a TeleHealth evaluation, many elements of the physical examination are unable to be assessed. Assessment and Plan:   Time-based coding, delete if not needed: I spent at least 15 minutes with this established patient, and >50% of the time was spent counseling and/or coordinating care regarding above issue      Assessment/Plan:    Melena: Patient reports 2-3 episodes of dark stools over the last 4 days. Has history of hemorrhoids and gastritis. Has never had colonoscopy/EGD. Patient is out of town. Given patient has symptomatic GI bleed, advised her to go to urgent care or ED as soon as possible. She expressed understanding and agreed with the plan. Time spent in direct conversation with the patient to include medical condition(s) discussed, assessment and treatment plan:       We discussed the expected course, resolution and complications of the diagnosis(es) in detail. Medication risks, benefits, costs, interactions, and alternatives were discussed as indicated. I advised her to contact the office if her condition worsens, changes or fails to improve as anticipated. She expressed understanding with the diagnosis(es) and plan. Patient understands that this encounter was a temporary measure, and the importance of further follow up and examination was emphasized. Patient verbalized understanding.        Patient informed to follow up: ED for evaluation. Discussed with Dr. Johny Briones. Electronically Signed: Dione Carlos MD, Family Medicine Resident    CPT Codes 06028-67024 for Established Patients may apply to this Telehealth Visit. POS code: 18. Modifier GT    Sue Estrada is a 48 y.o. female who was evaluated by an audio-video encounter for concerns as above. Patient identification was verified prior to start of the visit. A caregiver was present when appropriate. Due to this being a TeleHealth encounter (During VECXN-32 public health emergency), evaluation of the following organ systems was limited: Vitals/Constitutional/EENT/Resp/CV/GI//MS/Neuro/Skin/Heme-Lymph-Imm. Pursuant to the emergency declaration under the [address] [place] [address], [facility] waiver authority and the [employer] and [employer] Act, this Virtual Visit was conducted, with patient's (and/or legal guardian's) consent, to reduce the patient's risk of exposure to COVID-19 and provide necessary medical care. Services were provided through a synchronous discussion virtually to substitute for in-person clinic visit. I was in the office. The patient was at home. History   Patients past medical, surgical and family histories were reviewed and updated.       Past Medical History:   Diagnosis Date    Hypothyroid     Psychotic disorder ([facility] [address]      Past Surgical History:   Procedure Laterality Date    HX BLADDER SUSPENSION      HX CARPAL TUNNEL RELEASE      x 3 on right hand    HX HYSTERECTOMY      No idea what was left behind, for fibroids    HX TUMOR REMOVAL      As a child, right side of neck     Family History   Problem Relation Age of Onset    Heart Attack Mother 54    Diabetes Mother     Hypertension Mother     Heart Attack Father 48    Seizures Father     Psychiatric Disorder Father      Social History     Socioeconomic History    Marital status: SINGLE     Spouse name: Not on file    Number of children: Not on file    Years of education: Not on file    Highest education level: Not on file   Occupational History    Not on file   Social Needs    Financial resource strain: Not on file    Food insecurity     Worry: Not on file     Inability: Not on file    Transportation needs     Medical: Not on file     Non-medical: Not on file   Tobacco Use    Smoking status: Current Every Day Smoker     Types: Cigarettes    Smokeless tobacco: Never Used   Substance and Sexual Activity    Alcohol use: Yes     Frequency: Monthly or less     Drinks per session: 1 or 2    Drug use: Yes     Frequency: 7.0 times per week     Types: Marijuana    Sexual activity: Yes     Partners: Female, Male     Birth control/protection: Condom   Lifestyle    Physical activity     Days per week: Not on file     Minutes per session: Not on file    Stress: Not on file   Relationships    Social connections     Talks on phone: Not on file     Gets together: Not on file     Attends [employer] service: Not on file     Active member of club or organization: Not on file     Attends meetings of clubs or organizations: Not on file     Relationship status: Not on file    Intimate partner violence     Fear of current or ex partner: Not on file     Emotionally abused: Not on file     Physically abused: Not on file     Forced sexual activity: Not on file   Other Topics Concern    Not on file   Social History Narrative    Not on file     Patient Active Problem List   Diagnosis Code    Obstructive sleep apnea syndrome G47.33    Acquired hypothyroidism E03.9    Bipolar disorder in partial remission ([facility] [address] F31.70    Seizures ([facility] [address] R56.9    Hyperlipidemia E78.5    Anemia D64.9    Bradycardia R00.1    Bilateral hearing loss H91.93    Rheumatic arteritis I00    Moderate persistent asthma without complication I87.44    Current every day smoker F17.200    Chronic pain syndrome G89.4          Current Medications/Allergies   Medications and Allergies reviewed:    Current Outpatient Medications   Medication Sig Dispense Refill    Arm Brace (Wrist Brace) misc Apply to right wrist as needed for pain 1 Each 0    tiZANidine (ZANAFLEX) 4 mg tablet TAKE 1 TABLET BY MOUTH TWICE DAILY AS NEEDED FOR MUSCLE SPASM 60 Tab 0    divalproex DR (Depakote) 500 mg tablet Take 2 Tabs by mouth two (2) times a day for 90 days. 120 Tab 2    ARIPiprazole (Abilify) 15 mg tablet Take 1 Tab by mouth daily. 90 Tab 1    atorvastatin (LIPITOR) 80 mg tablet Take 0.5 Tabs by mouth daily. 90 Tab 3    levothyroxine (SYNTHROID) 137 mcg tablet Take 137 mcg by mouth Daily (before breakfast). 90 Tab 1    pantoprazole (PROTONIX) 40 mg tablet Take 1 Tab by mouth daily. 90 Tab 1    topiramate (TOPAMAX) 100 mg tablet Take 1 Tab by mouth daily. 90 Tab 1    montelukast (SINGULAIR) 10 mg tablet Take 1 Tab by mouth daily.  90 Tab 1     Allergies   Allergen Reactions    Ibuprofen Swelling

## 2020-11-30 ENCOUNTER — PATIENT OUTREACH (OUTPATIENT)
Dept: CASE MANAGEMENT | Age: 55
End: 2020-11-30

## 2020-11-30 NOTE — PROGRESS NOTES
Patient resolved from Transition of Care episode on 11/27/2020. ACM/CTN was unsuccessful at contacting this patient today. Patient/family was provided the following resources and education related to COVID-19 during the initial call:                         Signs, symptoms and red flags related to COVID-19            CDC exposure and quarantine guidelines            [facility]it exposure contact - [phone]            Contact for their local Department of Health                 Patient has not had any additional ED or hospital visits. No further outreach scheduled with this CTN/ACM. Episode of Care resolved. Patient has this CTN/ACM contact information if future needs arise.

## 2020-12-08 ENCOUNTER — VIRTUAL VISIT (OUTPATIENT)
Dept: BEHAVIORAL/MENTAL HEALTH CLINIC | Age: 55
End: 2020-12-08
Payer: COMMERCIAL

## 2020-12-08 DIAGNOSIS — F31.70 BIPOLAR DISORDER IN PARTIAL REMISSION, MOST RECENT EPISODE UNSPECIFIED TYPE (HCC): ICD-10-CM

## 2020-12-08 DIAGNOSIS — Z51.81 MEDICATION MONITORING ENCOUNTER: Primary | ICD-10-CM

## 2020-12-08 DIAGNOSIS — R56.9 SEIZURES (HCC): ICD-10-CM

## 2020-12-08 PROCEDURE — 99214 OFFICE O/P EST MOD 30 MIN: CPT | Performed by: NURSE PRACTITIONER

## 2020-12-08 RX ORDER — DIVALPROEX SODIUM 500 MG/1
1000 TABLET, DELAYED RELEASE ORAL 2 TIMES DAILY
Qty: 360 TAB | Refills: 0 | Status: SHIPPED | OUTPATIENT
Start: 2020-12-08 | End: 2021-01-15 | Stop reason: SDUPTHER

## 2020-12-08 RX ORDER — ARIPIPRAZOLE 15 MG/1
15 TABLET ORAL DAILY
Qty: 90 TAB | Refills: 1 | Status: SHIPPED | OUTPATIENT
Start: 2020-12-08 | End: 2021-01-15 | Stop reason: SDUPTHER
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
## 2021-01-15 ENCOUNTER — VIRTUAL VISIT (OUTPATIENT)
Dept: BEHAVIORAL/MENTAL HEALTH CLINIC | Age: 56
End: 2021-01-15
Payer: COMMERCIAL

## 2021-01-15 DIAGNOSIS — R56.9 SEIZURES (HCC): ICD-10-CM

## 2021-01-15 DIAGNOSIS — F31.70 BIPOLAR DISORDER IN PARTIAL REMISSION, MOST RECENT EPISODE UNSPECIFIED TYPE (HCC): Primary | ICD-10-CM

## 2021-01-15 PROCEDURE — 99212 OFFICE O/P EST SF 10 MIN: CPT | Performed by: NURSE PRACTITIONER

## 2021-01-15 RX ORDER — DIVALPROEX SODIUM 500 MG/1
1000 TABLET, DELAYED RELEASE ORAL 2 TIMES DAILY
Qty: 360 TAB | Refills: 0 | Status: SHIPPED | OUTPATIENT
Start: 2021-01-15 | End: 2021-04-25 | Stop reason: SDUPTHER

## 2021-01-15 RX ORDER — ARIPIPRAZOLE 15 MG/1
15 TABLET ORAL DAILY
Qty: 90 TAB | Refills: 1 | Status: SHIPPED | OUTPATIENT
Start: 2021-01-15 | End: 2021-04-25 | Stop reason: SDUPTHER

## 2021-01-15 NOTE — PROGRESS NOTES
Claudia Mishra (: 1965) is a 48 y.o. female, established patient, here for evaluation of the following chief complaint(s):   Follow-up (\"I'm doing really good. \") and Medication Management       ASSESSMENT/PLAN:  1. Bipolar disorder in partial remission, most recent episode unspecified type (HCC)  -     ARIPiprazole (Abilify) 15 mg tablet; Take 1 Tab by mouth daily. , Normal, Disp-90 Tab, R-1  -     divalproex DR (Depakote) 500 mg tablet; Take 2 Tabs by mouth two (2) times a day for 90 days. , Normal, Disp-360 Tab, R-0    2. Seizures (HCC)  -     divalproex DR (Depakote) 500 mg tablet; Take 2 Tabs by mouth two (2) times a day for 90 days. , Normal, Disp-360 Tab, R-0        Return in about 3 months (around 4/15/2021), or if symptoms worsen or fail to improve. SUBJECTIVE/OBJECTIVE:  Ms. Serena Allred consents to virtual visit. Patient follows up in the clinic for bipolar disorder. She last visited the clinic 2020. Patient continues Depakote 500 mg tablet take 2 tablets twice daily and Abilify 15 mg tablet take 1 tablet daily. On today's visit, patient is requesting refills. Patient presents with euthymic mood. She is smiling and pleasant on interaction. Patient reports that she had a very good holiday because she spent time with her son and her 6 grandchildren in [place]. Patient reports this has been the best holiday season in a very long time. She reports fluctuating sleep and reduced appetite. Patient mentions that she has anemia and is waiting to hear back from her medical doctor regarding feeling lightheaded for the past week, she is expecting a call today. Patient denies hallucinations and none noted on interaction. No suicidal or homicidal thinking noted, risk for suicide is low, protective factor-family. Patient smokes half pack of cigarettes daily and denies alcohol use. She smokes marijuana regularly. Review of Systems   All other systems reviewed and are negative. Patient-Reported Vitals 1/15/2021   Patient-Reported Weight 178 pounds       Physical Exam  Psychiatric:         Attention and Perception: Attention and perception normal.         Mood and Affect: Mood and affect normal.         Speech: Speech normal.         Behavior: Behavior normal. Behavior is cooperative. Thought Content: Thought content normal.         Cognition and Memory: Cognition and memory normal.         Judgment: Judgment normal.         Plan:    Continue Depakote and Abilify as prescribed. Follow-up with medical provider as appropriate. Advised patient to avoid smoking, alcohol and drug use. Advised patient to call 911 or go to the emergency department for suicidal homicidal thinking. Patient may call the office for any issues. Jw Jimenez is being evaluated by a Virtual Visit (video visit) encounter to address concerns as mentioned above. A caregiver was present when appropriate. Due to this being a TeleHealth encounter (During [facility] public health emergency), evaluation of the following organ systems was limited: Vitals/Constitutional/EENT/Resp/CV/GI//MS/Neuro/Skin/Heme-Lymph-Imm. Pursuant to the emergency declaration under the [facility], [address] [place] [address] authority and the [employer] Act, this Virtual Visit was conducted with patient's (and/or legal guardian's) consent, to reduce the patient's risk of exposure to COVID-19 and provide necessary medical care. The patient (and/or legal guardian) has also been advised to contact this office for worsening conditions or problems, and seek emergency medical treatment and/or call 911 if deemed necessary. Patient identification was verified at the start of the visit: YES    Services were provided through a video synchronous discussion virtually to substitute for in-person clinic visit.  Patient was located at home and provider was located in office or at home. An electronic signature was used to authenticate this note.   -- Halley Gillespie NP

## 2021-01-18 ENCOUNTER — TELEPHONE (OUTPATIENT)
Dept: FAMILY MEDICINE CLINIC | Age: 56
End: 2021-01-18

## 2021-01-18 NOTE — TELEPHONE ENCOUNTER
Andrew message of 8:42 am of today 1/18. SPOKE WITH PATIENT who said she has been waiting for her doctor to call her and that she was dizzy the weekend and fell. Dr. Schwarz Ezequiel telephone  Received:  Today  Message Contents   Destinee Matthew [facility] Front Office             General Message/Vendor Calls     Caller's first and last name: Pt     Reason for call: Pt experiencing dizziness and light headedness     Callback required yes/no and why: yes, schedule VV     Best contact number(s): [phone]     Details to clarify the request: pt refusing to see alternative provider     Emmett Ocampo

## 2021-01-18 NOTE — TELEPHONE ENCOUNTER
----- Message from [place] sent at 1/15/2021  8:27 AM EST -----  Regarding: Dr. Jimy Rincon  Appointment not available    Caller's first and last name and relationship to patient (if not the patient):  n/a      Best contact number: [phone]      Preferred date and time:  first available      Scheduled appointment date and time:  n/a      Reason for appointment:  Follow Up and experiencing dizziness      Details to clarify the request:  [facility]

## 2021-01-18 NOTE — TELEPHONE ENCOUNTER
Spoke with patient in regards to dizziness. She states she is feeling dizzy when she stands up quickly and experienced a fall over the weekend d/t this. Advised patient to move slowing into different positions and appointment schedule. ER precautions given to patient.  Will forward to Dr. Michi Link per patient request.

## 2021-01-25 ENCOUNTER — OFFICE VISIT (OUTPATIENT)
Dept: FAMILY MEDICINE CLINIC | Age: 56
End: 2021-01-25
Payer: COMMERCIAL

## 2021-01-25 VITALS
HEART RATE: 59 BPM | DIASTOLIC BLOOD PRESSURE: 78 MMHG | OXYGEN SATURATION: 96 % | BODY MASS INDEX: 32.49 KG/M2 | TEMPERATURE: 97.1 F | RESPIRATION RATE: 18 BRPM | WEIGHT: 195 LBS | SYSTOLIC BLOOD PRESSURE: 120 MMHG | HEIGHT: 65 IN

## 2021-01-25 DIAGNOSIS — Z79.899 LONG TERM USE OF DRUG: ICD-10-CM

## 2021-01-25 DIAGNOSIS — Z13.1 DIABETES MELLITUS SCREENING: ICD-10-CM

## 2021-01-25 DIAGNOSIS — D64.9 ANEMIA, UNSPECIFIED TYPE: ICD-10-CM

## 2021-01-25 DIAGNOSIS — M06.9 RHEUMATOID ARTHRITIS INVOLVING BOTH HANDS, UNSPECIFIED WHETHER RHEUMATOID FACTOR PRESENT (HCC): ICD-10-CM

## 2021-01-25 DIAGNOSIS — R42 DIZZINESS: ICD-10-CM

## 2021-01-25 DIAGNOSIS — R42 DIZZINESS: Primary | ICD-10-CM

## 2021-01-25 LAB — HGB BLD-MCNC: 12 G/DL

## 2021-01-25 PROCEDURE — 85018 HEMOGLOBIN: CPT | Performed by: STUDENT IN AN ORGANIZED HEALTH CARE EDUCATION/TRAINING PROGRAM

## 2021-01-25 PROCEDURE — 99214 OFFICE O/P EST MOD 30 MIN: CPT | Performed by: STUDENT IN AN ORGANIZED HEALTH CARE EDUCATION/TRAINING PROGRAM

## 2021-01-25 PROCEDURE — 93000 ELECTROCARDIOGRAM COMPLETE: CPT | Performed by: STUDENT IN AN ORGANIZED HEALTH CARE EDUCATION/TRAINING PROGRAM

## 2021-01-25 RX ORDER — PREGABALIN 50 MG/1
50 CAPSULE ORAL EVERY 12 HOURS
COMMUNITY
End: 2021-09-03

## 2021-01-25 NOTE — PROGRESS NOTES
Identified pt with two pt identifiers(name and ). Reviewed record in preparation for visit and have obtained necessary documentation. Chief Complaint   Patient presents with    Dizziness     Has been experiencing dizziness x 2 weeks. Mily Franklin 1/15/21        Health Maintenance Due   Topic    Pneumococcal 0-64 years (1 of 1 - PPSV23)    COVID-19 Vaccine (1 of 2)    DTaP/Tdap/Td series (1 - Tdap)    PAP AKA CERVICAL CYTOLOGY     Shingrix Vaccine Age 50> (1 of 2)    Colorectal Cancer Screening Combo     Flu Vaccine (1)      Vitals:    21 1313 21 1407 21 1409 21 1410   BP: 111/71 133/76 130/79 120/78   BP 1 Location: Left arm Left arm Left arm Left arm   BP Patient Position: Sitting Supine Sitting Standing   Pulse: (!) 59 (!) 57 (!) 53 (!) 59   Resp: 18      Temp: 97.1 °F (36.2 °C)      TempSrc: Temporal      SpO2: 98% 99% 99% 96%   Weight: 195 lb (88.5 kg)      Height: 5' 5\" (1.651 m)            Coordination of Care Questionnaire:  :   1) Have you been to an emergency room, urgent care, or hospitalized since your last visit? If yes, where when, and reason for visit? no      2. Have seen or consulted any other health care provider since your last visit? If yes, where when, and reason for visit?   NO

## 2021-01-25 NOTE — PROGRESS NOTES
[address] [facility] [address] [place] [address]   Office [phone], Fax [phone]    Subjective:     Chief Complaint   Patient presents with    Dizziness     Has been experiencing dizziness x 2 weeks. Marlin Sox 1/15/21     History provided by patient     HPI:  Elizabeth Son is a 48 y.o. OTHER female with past medical history of Hypothyroidism, Psychotic Disorder, Hyperlipidemia, Seizures, and Smoking Use presents for   Chief Complaint   Patient presents with    Dizziness     Has been experiencing dizziness x 2 weeks. Marlin Sox 1/15/21       Ms. Angel Jackman has been suffering from dizziness for the past two weeks. It has recently worsened and she had a fall to her knees on 1/15/21. She did not hit her head or lose conciousness. She was eventually able to bring herself back to her feet on her own. She says that the dizziness comes and goes. Sometimes it happens when she gets up quickly. Sometimes it happens when she is twisting in bed or turning her head. She does not have palpiations or a history of Afib. She says that sometimes she feels like the room is spinning. She does not have chest pain, shortness of breath, focal weakness, headaches, blurry vision.     Social History     Socioeconomic History    Marital status: SINGLE     Spouse name: Not on file    Number of children: 2    Years of education: Not on file    Highest education level: GED or equivalent   Occupational History    Not on file   Social Needs    Financial resource strain: Hard    Food insecurity     Worry: Never true     Inability: Never true    Transportation needs     Medical: No     Non-medical: No   Tobacco Use    Smoking status: Current Every Day Smoker     Packs/day: 0.50     Types: Cigarettes    Smokeless tobacco: Never Used   Substance and Sexual Activity    Alcohol use: Not Currently     Frequency: Monthly or less     Drinks per session: 1 or 2    Drug use: Yes     Frequency: 7.0 times per week     Types: Marijuana    Sexual activity: Yes     Partners: Female, Male     Birth control/protection: Condom   Lifestyle    Physical activity     Days per week: Not on file     Minutes per session: Not on file    Stress: Not on file   Relationships    Social connections     Talks on phone: Not on file     Gets together: Not on file     Attends Jehovah's witness service: Not on file     Active member of club or organization: Not on file     Attends meetings of clubs or organizations: Not on file     Relationship status: Not on file    Intimate partner violence     Fear of current or ex partner: Not on file     Emotionally abused: Not on file     Physically abused: Not on file     Forced sexual activity: Not on file   Other Topics Concern    Not on file   Social History Narrative    Not on file     Review of Systems   HENT: Negative for hearing loss and tinnitus. Eyes: Negative for blurred vision. Respiratory: Negative for shortness of breath. Cardiovascular: Negative for chest pain and palpitations. Neurological: Positive for dizziness. Negative for focal weakness, loss of consciousness and headaches. Objective:     Visit Vitals  /78 (BP 1 Location: Left arm, BP Patient Position: Standing)   Pulse (!) 59   Temp 97.1 °F (36.2 °C) (Temporal)   Resp 18   Ht 5' 5\" (1.651 m)   Wt 195 lb (88.5 kg)   SpO2 96%   BMI 32.45 kg/m²      Older Vitals  1/25/21 2:07 PM  1/25/21 2:09 PM  1/25/21 2:10 PM     BP  133/76   130/79   120/78     BP 1 Location  Left arm   Left arm   Left arm     BP Patient Position  Supine   Sitting   Standing     Pulse  57   53   59     SpO2  99%   99%   96%      Physical Exam  Constitutional:       Appearance: Normal appearance. HENT:      Head: Normocephalic and atraumatic. Right Ear: Tympanic membrane normal.      Left Ear: Tympanic membrane normal.   Eyes:      Extraocular Movements: Extraocular movements intact. Pupils: Pupils are equal, round, and reactive to light.    Cardiovascular:      Rate and Rhythm: Normal rate and regular rhythm. Pulses: Normal pulses. Heart sounds: Normal heart sounds. Pulmonary:      Effort: Pulmonary effort is normal.      Breath sounds: Normal breath sounds. Abdominal:      General: Abdomen is flat. Bowel sounds are normal.      Palpations: Abdomen is soft. Neurological:      General: No focal deficit present. Mental Status: She is alert and oriented to person, place, and time. Cranial Nerves: No cranial nerve deficit. Sensory: No sensory deficit. Motor: No weakness. Comments: Positive Rita-Chilel Greeley       Pertinent Labs/Studies:   POC Hb - 12.0  POC EKG - Rate of 53, no evidence of ST elevation or depression, T-wave abnormality present, NV interval 194. Assessment and orders:       ICD-10-CM ICD-9-CM    1. Dizziness  R42 780.4 CBC WITH AUTOMATED DIFF      AMB POC EKG ROUTINE W/ 12 LEADS, INTER & REP      URINALYSIS W/ RFLX MICROSCOPIC      AMB POC HEMOGLOBIN (HGB)   2. Anemia, unspecified type  D64.9 285.9 CBC WITH AUTOMATED DIFF      AMB POC HEMOGLOBIN (HGB)   3. Diabetes mellitus screening  Z13.1 V77.1 HEMOGLOBIN A1C WITH EAG   4. Long term use of drug  Z14.841 K09.05 METABOLIC PANEL, COMPREHENSIVE   5. Rheumatoid arthritis involving both hands, unspecified whether rheumatoid factor present ([facility]  M06.9 714.0 REFERRAL TO RHEUMATOLOGY      CANCELED: ERICA, DIRECT, W/REFLEX     1. Dizziness: inner ear pathology vs cardiac abnormality vs lyrica use. Unlikely due to orthostatic blood pressure or dehydration or anemia because of blood pressure readings normal between positions and POC Hb of 12. - Referral to cardiology  - Obtain U/A  - Obtain CBC, CMP, HbA1c     Labs, imaging and immunization ordered as above    Follow Up: In 2 weeks    Pt was discussed with Dr. Herbert Munguia (attending physician). I have reviewed patient medical and social history and medications. I have reviewed pertinent labs results and other data.  I have discussed the diagnosis with the patient and the intended plan as seen in the above orders. The patient has received an after-visit summary and questions were answered concerning future plans. I have discussed medication side effects and warnings with the patient as well.     Edgardo Ventura MD  Resident [facility]  01/25/21

## 2021-01-25 NOTE — PATIENT INSTRUCTIONS
Dr. Rajan Read (Rheumatology): [phone]    Please call to schedule your appointment with provider/location then call our office back @ #[phone] to leave a message for our referral coordinator with the appointment information (date/time/providers full name) for whom you will be seeing for this referral order so that we can authorize your visit(s) with your insurance if needed and referral tracking purposes of this order. Anemia: Care Instructions  Your Care Instructions     Anemia is a low level of red blood cells, which carry oxygen throughout your body. Many things can cause anemia. Lack of iron is one of the most common causes. Your body needs iron to make hemoglobin, a substance in red blood cells that carries oxygen from the lungs to your body's cells. Without enough iron, the body produces fewer and smaller red blood cells. As a result, your body's cells do not get enough oxygen, and you feel tired and weak. And you may have trouble concentrating. Bleeding is the most common cause of a lack of iron. You may have heavy menstrual bleeding or bleeding caused by conditions such as ulcers, hemorrhoids, or cancer. Regular use of aspirin or other anti-inflammatory medicines (such as ibuprofen) also can cause bleeding in some people. A lack of iron in your diet also can cause anemia, especially at times when the body needs more iron, such as during pregnancy, infancy, and the teen years. Your doctor may have prescribed iron pills. It may take several months of treatment for your iron levels to return to normal. Your doctor also may suggest that you eat foods that are rich in iron, such as meat and beans. There are many other causes of anemia. It is not always due to a lack of iron. Finding the specific cause of your anemia will help your doctor find the right treatment for you. Follow-up care is a key part of your treatment and safety.  Be sure to make and go to all appointments, and call your doctor if you are having problems. It's also a good idea to know your test results and keep a list of the medicines you take. How can you care for yourself at home? · Take your medicines exactly as prescribed. Call your doctor if you think you are having a problem with your medicine. · If your doctor recommends iron pills, take them as directed:  ? Try to take the pills on an empty stomach about 1 hour before or 2 hours after meals. But you may need to take iron with food to avoid an upset stomach. ? Do not take antacids or drink milk or caffeine drinks (such as coffee, tea, or cola) at the same time or within 2 hours of the time that you take your iron. They can make it hard for your body to absorb the iron. ? Vitamin C (from food or supplements) helps your body absorb iron. Try taking iron pills with a glass of orange juice or some other food that is high in vitamin C, such as citrus fruits. ? Iron pills may cause stomach problems, such as heartburn, nausea, diarrhea, constipation, and cramps. Be sure to drink plenty of fluids, and include fruits, vegetables, and fiber in your diet each day. Iron pills often make your bowel movements dark or green. ? If you forget to take an iron pill, do not take a double dose of iron the next time you take a pill. ? Keep iron pills out of the reach of small children. An overdose of iron can be very dangerous. · Follow your doctor's advice about eating iron-rich foods. These include red meat, shellfish, poultry, eggs, beans, raisins, whole-grain bread, and leafy green vegetables. · Steam vegetables to help them keep their iron content. When should you call for help? Call 911 anytime you think you may need emergency care. For example, call if:    · You have symptoms of a heart attack. These may include:  ? Chest pain or pressure, or a strange feeling in the chest.  ? Sweating. ? Shortness of breath. ? Nausea or vomiting.   ? Pain, pressure, or a strange feeling in the back, neck, jaw, or upper belly or in one or both shoulders or arms. ? Lightheadedness or sudden weakness. ? A fast or irregular heartbeat. After you call 911, the  may tell you to chew 1 adult-strength or 2 to 4 low-dose aspirin. Wait for an ambulance. Do not try to drive yourself.     · You passed out (lost consciousness). Call your doctor now or seek immediate medical care if:    · You have new or increased shortness of breath.     · You are dizzy or lightheaded, or you feel like you may faint.     · Your fatigue and weakness continue or get worse.     · You have any abnormal bleeding, such as:  ? Nosebleeds. ? Vaginal bleeding that is different (heavier, more frequent, at a different time of the month) than what you are used to.  ? Bloody or black stools, or rectal bleeding. ? Bloody or pink urine. Watch closely for changes in your health, and be sure to contact your doctor if:    · You do not get better as expected. Where can you learn more? Go to http://www.noriega.com/  Enter R301 in the search box to learn more about \"Anemia: Care Instructions. \"  Current as of: November 8, 2019               Content Version: 12.6  © 9370-0500 [employer]. Care instructions adapted under license by [employer] (which disclaims liability or warranty for this information). If you have questions about a medical condition or this instruction, always ask your healthcare professional. [place] [address] any warranty or liability for your use of this information. Vertigo: Care Instructions  Your Care Instructions     Vertigo is the feeling that you or your surroundings are moving when there is no actual movement. It is often described as a feeling of spinning, whirling, falling, or tilting. Vertigo may make you vomit or feel nauseated. You may have trouble standing or walking and may lose your balance.   Vertigo is often related to an inner ear problem, but it can have other more serious causes. If vertigo continues, you may need more tests to find its cause. Follow-up care is a key part of your treatment and safety. Be sure to make and go to all appointments, and call your doctor if you are having problems. It's also a good idea to know your test results and keep a list of the medicines you take. How can you care for yourself at home? · Do not lie flat on your back. Prop yourself up slightly. This may reduce the spinning feeling. Keep your eyes open. · Move slowly so that you do not fall. · If your doctor recommends medicine, take it exactly as directed. · Do not drive while you are having vertigo. Certain exercises, called Brown-Daroff exercises, can help decrease vertigo. To do Brown-Daroff exercises:  · Sit on the edge of a bed or sofa and quickly lie down on the side that causes the worst vertigo. Lie on your side with your ear down. · Stay in this position for at least 30 seconds or until the vertigo goes away. · Sit up. If this causes vertigo, wait for it to stop. · Repeat the procedure on the other side. · Repeat this 10 times. Do these exercises 2 times a day until the vertigo is gone. When should you call for help? Call 911 anytime you think you may need emergency care. For example, call if:    · You passed out (lost consciousness).     · You have symptoms of a stroke. These may include:  ? Sudden numbness, tingling, weakness, or loss of movement in your face, arm, or leg, especially on only one side of your body. ? Sudden vision changes. ? Sudden trouble speaking. ? Sudden confusion or trouble understanding simple statements. ? Sudden problems with walking or balance. ? A sudden, severe headache that is different from past headaches. Call your doctor now or seek immediate medical care if:    · Vertigo occurs with a fever, a headache, or ringing in your ears.     · You have new or increased nausea and vomiting.    Watch closely for changes in your health, and be sure to contact your doctor if:    · Vertigo gets worse or happens more often.     · Vertigo has not gotten better after 2 weeks. Where can you learn more? Go to http://www.gray.com/  Enter A696 in the search box to learn more about \"Vertigo: Care Instructions. \"  Current as of: April 15, 2020               Content Version: 12.6  © 2006-2020 [employer]. Care instructions adapted under license by [employer] (which disclaims liability or warranty for this information). If you have questions about a medical condition or this instruction, always ask your healthcare professional. [place] [address] any warranty or liability for your use of this information. Dizziness: Care Instructions  Your Care Instructions  Dizziness is the feeling of unsteadiness or fuzziness in your head. It is different than having vertigo, which is a feeling that the room is spinning or that you are moving or falling. It is also different from lightheadedness, which is the feeling that you are about to faint. It can be hard to know what causes dizziness. Some people feel dizzy when they have migraine headaches. Sometimes bouts of flu can make you feel dizzy. Some medical conditions, such as heart problems or high blood pressure, can make you feel dizzy. Many medicines can cause dizziness, including medicines for high blood pressure, pain, or anxiety. If a medicine causes your symptoms, your doctor may recommend that you stop or change the medicine. If it is a problem with your heart, you may need medicine to help your heart work better. If there is no clear reason for your symptoms, your doctor may suggest watching and waiting for a while to see if the dizziness goes away on its own. Follow-up care is a key part of your treatment and safety. Be sure to make and go to all appointments, and call your doctor if you are having problems.  It's also a good idea to know your test results and keep a list of the medicines you take. How can you care for yourself at home? · If your doctor recommends or prescribes medicine, take it exactly as directed. Call your doctor if you think you are having a problem with your medicine. · Do not drive while you feel dizzy. · Try to prevent falls. Steps you can take include:  ? Using nonskid mats, adding grab bars near the tub, and using night-lights. ? Clearing your home so that walkways are free of anything you might trip on.  ? Letting family and friends know that you have been feeling dizzy. This will help them know how to help you. When should you call for help? Call 911 anytime you think you may need emergency care. For example, call if:    · You passed out (lost consciousness).     · You have dizziness along with symptoms of a heart attack. These may include:  ? Chest pain or pressure, or a strange feeling in the chest.  ? Sweating. ? Shortness of breath. ? Nausea or vomiting. ? Pain, pressure, or a strange feeling in the back, neck, jaw, or upper belly or in one or both shoulders or arms. ? Lightheadedness or sudden weakness. ? A fast or irregular heartbeat.     · You have symptoms of a stroke. These may include:  ? Sudden numbness, tingling, weakness, or loss of movement in your face, arm, or leg, especially on only one side of your body. ? Sudden vision changes. ? Sudden trouble speaking. ? Sudden confusion or trouble understanding simple statements. ? Sudden problems with walking or balance. ? A sudden, severe headache that is different from past headaches. Call your doctor now or seek immediate medical care if:    · You feel dizzy and have a fever, headache, or ringing in your ears.     · You have new or increased nausea and vomiting.     · Your dizziness does not go away or comes back.    Watch closely for changes in your health, and be sure to contact your doctor if:    · You do not get better as expected. Where can you learn more? Go to http://www.gray.com/  Enter Q823 in the search box to learn more about \"Dizziness: Care Instructions. \"  Current as of: June 26, 2019               Content Version: 12.6  © 2168-5779 [employer]. Care instructions adapted under license by [employer] (which disclaims liability or warranty for this information). If you have questions about a medical condition or this instruction, always ask your healthcare professional. [place] [address] any warranty or liability for your use of this information.

## 2021-01-26 LAB
ALBUMIN SERPL-MCNC: 4 G/DL (ref 3.5–5)
ALBUMIN/GLOB SERPL: 1.1 {RATIO} (ref 1.1–2.2)
ALP SERPL-CCNC: 102 U/L (ref 45–117)
ALT SERPL-CCNC: 18 U/L (ref 12–78)
ANION GAP SERPL CALC-SCNC: 2 MMOL/L (ref 5–15)
AST SERPL-CCNC: 17 U/L (ref 15–37)
BASOPHILS # BLD: 0 K/UL (ref 0–0.1)
BASOPHILS NFR BLD: 0 % (ref 0–1)
BILIRUB SERPL-MCNC: 0.3 MG/DL (ref 0.2–1)
BUN SERPL-MCNC: 13 MG/DL (ref 6–20)
BUN/CREAT SERPL: 17 (ref 12–20)
CALCIUM SERPL-MCNC: 9.9 MG/DL (ref 8.5–10.1)
CHLORIDE SERPL-SCNC: 110 MMOL/L (ref 97–108)
CO2 SERPL-SCNC: 30 MMOL/L (ref 21–32)
CREAT SERPL-MCNC: 0.77 MG/DL (ref 0.55–1.02)
DIFFERENTIAL METHOD BLD: ABNORMAL
EOSINOPHIL # BLD: 0.4 K/UL (ref 0–0.4)
EOSINOPHIL NFR BLD: 4 % (ref 0–7)
ERYTHROCYTE [DISTWIDTH] IN BLOOD BY AUTOMATED COUNT: 15.9 % (ref 11.5–14.5)
EST. AVERAGE GLUCOSE BLD GHB EST-MCNC: 117 MG/DL
GLOBULIN SER CALC-MCNC: 3.8 G/DL (ref 2–4)
GLUCOSE SERPL-MCNC: 83 MG/DL (ref 65–100)
HBA1C MFR BLD: 5.7 % (ref 4–5.6)
HCT VFR BLD AUTO: 40.8 % (ref 35–47)
HGB BLD-MCNC: 12.6 G/DL (ref 11.5–16)
IMM GRANULOCYTES # BLD AUTO: 0.1 K/UL (ref 0–0.04)
IMM GRANULOCYTES NFR BLD AUTO: 1 % (ref 0–0.5)
LYMPHOCYTES # BLD: 3.1 K/UL (ref 0.8–3.5)
LYMPHOCYTES NFR BLD: 30 % (ref 12–49)
MCH RBC QN AUTO: 28.4 PG (ref 26–34)
MCHC RBC AUTO-ENTMCNC: 30.9 G/DL (ref 30–36.5)
MCV RBC AUTO: 91.9 FL (ref 80–99)
MONOCYTES # BLD: 0.8 K/UL (ref 0–1)
MONOCYTES NFR BLD: 8 % (ref 5–13)
NEUTS SEG # BLD: 5.9 K/UL (ref 1.8–8)
NEUTS SEG NFR BLD: 57 % (ref 32–75)
NRBC # BLD: 0 K/UL (ref 0–0.01)
NRBC BLD-RTO: 0 PER 100 WBC
PLATELET # BLD AUTO: 328 K/UL (ref 150–400)
PMV BLD AUTO: 11 FL (ref 8.9–12.9)
POTASSIUM SERPL-SCNC: 4.1 MMOL/L (ref 3.5–5.1)
PROT SERPL-MCNC: 7.8 G/DL (ref 6.4–8.2)
RBC # BLD AUTO: 4.44 M/UL (ref 3.8–5.2)
SODIUM SERPL-SCNC: 142 MMOL/L (ref 136–145)
WBC # BLD AUTO: 10.2 K/UL (ref 3.6–11)

## 2021-01-27 ENCOUNTER — TELEPHONE (OUTPATIENT)
Dept: FAMILY MEDICINE CLINIC | Age: 56
End: 2021-01-27

## 2021-01-27 NOTE — TELEPHONE ENCOUNTER
Per pt call, appt is 4/5/21 at 11:00 am with below doctor        Procedure Modifiers Provider Requested Approved   [id number] - REFERRAL TO RHEUMATOLOGY None Yuriy Rogers MD 1 1   Diagnosis Information    Diagnosis   M06.9 (ICD-10-CM) - Rheumatoid arthritis involving both hands, unspecified whether rheumatoid factor present ([facility]         Office ph [phone]

## 2021-01-28 LAB
APPEARANCE UR: ABNORMAL
BACTERIA SPEC CULT: ABNORMAL
BACTERIA URNS QL MICRO: ABNORMAL /HPF
BILIRUB UR QL: NEGATIVE
CAOX CRY URNS QL MICRO: ABNORMAL
CC UR VC: ABNORMAL
COLOR UR: ABNORMAL
EPITH CASTS URNS QL MICRO: ABNORMAL /LPF
GLUCOSE UR STRIP.AUTO-MCNC: NEGATIVE MG/DL
HGB UR QL STRIP: ABNORMAL
KETONES UR QL STRIP.AUTO: NEGATIVE MG/DL
LEUKOCYTE ESTERASE UR QL STRIP.AUTO: ABNORMAL
NITRITE UR QL STRIP.AUTO: POSITIVE
PH UR STRIP: 5.5 [PH]
PROT UR STRIP-MCNC: ABNORMAL MG/DL
RBC #/AREA URNS HPF: ABNORMAL /HPF
SERVICE CMNT-IMP: ABNORMAL
SP GR UR REFRACTOMETRY: 1.02
UROBILINOGEN UR QL STRIP.AUTO: 1 EU/DL
WBC URNS QL MICRO: ABNORMAL /HPF

## 2021-01-28 NOTE — TELEPHONE ENCOUNTER
Updated Rheum ref order in [facility]    Close enc    Thanks  Abbey Jim S/PSR  [facility] Referral Coordinator

## 2021-04-02 ENCOUNTER — TELEPHONE (OUTPATIENT)
Dept: RHEUMATOLOGY | Age: 56
End: 2021-04-02

## 2021-04-05 ENCOUNTER — OFFICE VISIT (OUTPATIENT)
Dept: RHEUMATOLOGY | Age: 56
End: 2021-04-05

## 2021-04-05 VITALS
BODY MASS INDEX: 32.82 KG/M2 | HEIGHT: 65 IN | DIASTOLIC BLOOD PRESSURE: 83 MMHG | RESPIRATION RATE: 18 BRPM | SYSTOLIC BLOOD PRESSURE: 146 MMHG | HEART RATE: 63 BPM | WEIGHT: 197 LBS | TEMPERATURE: 97.7 F

## 2021-04-05 DIAGNOSIS — G89.29 CHRONIC RIGHT SHOULDER PAIN: ICD-10-CM

## 2021-04-05 DIAGNOSIS — M25.511 CHRONIC RIGHT SHOULDER PAIN: ICD-10-CM

## 2021-04-05 DIAGNOSIS — M25.50 POLYARTHRALGIA: Primary | ICD-10-CM

## 2021-04-05 PROCEDURE — 99204 OFFICE O/P NEW MOD 45 MIN: CPT | Performed by: INTERNAL MEDICINE

## 2021-04-05 NOTE — PATIENT INSTRUCTIONS
HAND OSTEOARTHRITIS. Osteoarthritis is also known as \"wear and tear arthritis,\" mechanical arthritis, or non-inflammatory arthritis. There are hereditary and vocational components and post-traumatic joint injuries may also predispose to it. It is not Rheumatoid Arthritis, however, patients with Rheumatoid Arthritis may also have osteoarthritis. I recommend: 
 
(1) Non-medicinal modalities such as keeping hands warm, avoid activities that case pain, warm water soaks 
 
(2) Oral medications, such as acetaminophen as first line (3000 mg maximum per day) and oral NSAIDs, such as naproxen (Aleve) two tablets of 220 mg twice daily with food, OR ibuprofen (Advil) two tablets of 200 mg three times daily, with food as second line therapy. Naproxen (Aleve) is associated with lower cardiovascular risk than other NSAIDs (Chong AMBROSE, Eyal SANTIAGO. Clinical Pharmacology and Cardiovascular Safety of Naproxen. Am J Cardiovasc Drugs. 2016 Nov 8). NSAIDs should not be used in patients on blood thinners, chronic kidney disease, high risk coronary artery disease, and inflammatory bowel disease (ulcerative colitis or Crohn's disease) 
 
(3) Topical medications, such as Capsaicin (which may cause a burning sensation) or NSAIDs, such as diclofenac gel (Voltaren gel), which is now over the counter. The combination of acetaminophen and NSAIDs are safe together. Please do not combine NSAIDs together, such as Aleve, Advil and Mobic (meloxicam) Please do hand ball squeezing and holding until your hand fatigues and then alternate to the next hand. Do that 10 times twice daily

## 2021-04-05 NOTE — PROGRESS NOTES
REASON FOR VISIT    This is the initial evaluation for Ms. Moiz Barboza a 48 y.o.  female for question of an inflammatory arthritis. The patient is referred to the [facility] at the request of Dr. Cecile Ayala. HISTORY OF PRESENT ILLNESS      I have reviewed and summarized old records from [address] [place], [place] [address]e E    In 2000, she developed chronic back and joint pain. In 1/25/2021, labs showed WBC 10.2, lymphocytes 3.1, Hct 40.8%, platelets 190,782, creatinine 0.77 mg/dL, eGFR >60, albumin 4.0 g/dL,  U/L, ALT 18 U/L, AST 17 U/L. Today, she complains of pain in her hands and that her joints are becoming deforming. This started 6/2020, when she moved from [place] to [place]. She feel her hand pain is worse in the morning or when the weather is cold or rainy. Sometimes, the pain is sharp but there will be a burning. The pain improves as the day goes on and then is worse at night. She notes intermittent swelling in her hands and has issues taking her rings off. She has morning stiffness lasting 20 minutes. Warm water helps. APAP does not help. She also complains of right shoulder pain that is chronic. She was referred to physical therapy which aggravated it more. She cannot take NSAIDs due to ibuprofen allergy. She reports having a right wrist surgery for carpal tunnel syndrome that was found to have cysts, so it was reconstructed. She is a smoker. She is disabled due to bipolar disorder. Therapy History includes:  Current DMARD therapy includes: none  Prior DMARD therapy includes: none  The following DMARDs have been ineffective: none  The following DMARDs were stopped because of side effects: none    REVIEW OF SYSTEMS    A 15 point review of systems was performed and summarized below. The questionnaire was reviewed with the patient and scanned into the patient's medical record.     General: endorses denies recent weight gain, recent weight loss, fatigue, weakness, fever, drenching night sweats  Musculoskeletal: endorses joint pain, morning stiffness (lasting 2- minutes), muscle pain, denies joint swelling  Ears: endorses denies ringing in ears, hearing loss, deafness  Eyes: endorses excess tearing, dryness, foreign body sensation, denies pain, light sensitive, redness, blindness, double vision, blurred vision  Mouth: denies sore tongue, oral ulcers, loss of taste, dryness, increased dental caries  Nose: denies nosebleeds, nasal ulcers  Throat: denies food stuck when swallowing, difficulty with swallowing, hoarseness, pain in jaw while chewing  Neck: denies swollen glands, tender glands  Cardiopulmonary: endorses wheezing, dry cough, shortness of breath on exertion,denies pain in chest with deep breaths, pain in chest when lying down, murmurs, sudden changes in heart beat, productive cough, shortness of breath at rest,  coughing of blood  Gastrointestinal: endorses chronic constipation, denies nausea, heartburn, stomach pain relieved by food, chronic diarrhea, blood in stools, black stools  Genitourinary: denies vaginal dryness, pain or burning on urination, blood in urine, cloudy urine, vaginal ulcers  Hematologic: endorses anemia, denies bleeding tendency, blood clots, bleeding gums  Skin: endorses easy bruising, hair loss, denies rash, rash worsened after sun exposure, hives/urticaria, skin thickening, skin tightness, nodules/bumps, color changes of hands or feet in the cold (Raynaud's)  Neurologic: endorses numbness or tingling in feet, denies numbness or tingling in hands, muscle weakness  Psychiatric: endorses depression, Bipolar, denies excessive worries, PTSD  Sleep: endorses poor sleep (5-6 hours), snoring, obstructive sleep apnea not on CPAP, daytime somnolence, difficulty falling asleep, difficulty staying asleep     PAST MEDICAL HISTORY    She has a past medical history of Hypothyroid and Psychotic disorder ([facility] [address]      FAMILY HISTORY    Her family history includes Arthritis-rheumatoid in her mother; Diabetes in her mother; Heart Attack (age of onset: 48) in her father; Heart Attack (age of onset: 54) in her mother; Hypertension in her mother; Other in her brother and sister; Psychiatric Disorder in her father; Seizures in her father. SOCIAL HISTORY    She reports that she has been smoking cigarettes. She has been smoking about 0.50 packs per day. She has never used smokeless tobacco. She reports current alcohol use. She reports current drug use. Frequency: 7.00 times per week. Drug: Marijuana. MEDICATIONS    Current Outpatient Medications   Medication Sig    albuterol sulfate (PROVENTIL IN) Take  by inhalation as needed.  pregabalin (Lyrica) 50 mg capsule Take 50 mg by mouth every twelve (12) hours.  ARIPiprazole (Abilify) 15 mg tablet Take 1 Tab by mouth daily.  divalproex DR (Depakote) 500 mg tablet Take 2 Tabs by mouth two (2) times a day for 90 days.  Arm Brace (Wrist Brace) misc Apply to right wrist as needed for pain    tiZANidine (ZANAFLEX) 4 mg tablet TAKE 1 TABLET BY MOUTH TWICE DAILY AS NEEDED FOR MUSCLE SPASM    levothyroxine (SYNTHROID) 137 mcg tablet Take 137 mcg by mouth Daily (before breakfast).  topiramate (TOPAMAX) 100 mg tablet Take 1 Tab by mouth daily.  montelukast (SINGULAIR) 10 mg tablet Take 1 Tab by mouth daily.  atorvastatin (LIPITOR) 80 mg tablet Take 0.5 Tabs by mouth daily. (Patient taking differently: Take 80 mg by mouth daily.)    pantoprazole (PROTONIX) 40 mg tablet Take 1 Tab by mouth daily. No current facility-administered medications for this visit. ALLERGIES  Allergies   Allergen Reactions    Ibuprofen Swelling       PHYSICAL EXAMINATION    Visit Vitals  BP (!) 146/83   Pulse 63   Temp 97.7 °F (36.5 °C)   Resp 18   Ht 5' 5\" (1.651 m)   Wt 197 lb (89.4 kg)   BMI 32.78 kg/m²     Body mass index is 32.78 kg/m².     General: Patient is alert, oriented x 3, not in acute distress    HEENT:   Conjunctiva are not injected and appear moist, there is no alopecia. Cardiovascular:  Heart is regular rate and rhythm, no murmurs. Chest:  Lungs are clear to auscultation bilaterally. Extremities:  Free of clubbing, cyanosis, edema, extremities well perfused. Neurological exam:  Muscle strength is full in upper and lower extremities. Skin exam:  There are no rashes, no tophi, no psoriasis, no active Raynaud's, no livedo reticularis, no periungual erythema. Musculoskeletal exam:  A comprehensive musculoskeletal exam was performed for all joints of each upper and lower extremity and assessed for swelling, tenderness and range of motion. Pertinent results are documented as below:    Bilateral Beatriz and Heberden nodes (most prominent on bilateral 5th DIP). Bilateral ankle tenderness    Z-Deformities:   no  Glidden Neck Deformities:  no  Boutonierre's Deformities:  no  Ulnar Deviation:   no  MCP Subluxation:  no    Joint Count 4/5/2021   Patient pain (0-100) 100   MHAQ 0.625   Left 2nd PIP - Tender 1   Left 2nd PIP - Swollen 0   Left 3rd PIP - Tender 1   Left 3rd PIP - Swollen 0   Left 4th PIP - Tender 1   Left 4th PIP - Swollen 0   Left 5th PIP - Tender 1   Left 5th PIP - Swollen 0   Right 2nd PIP - Tender 1   Right 2nd PIP - Swollen 0   Right 3rd PIP - Tender 1   Right 3rd PIP - Swollen 0   Right 4th PIP - Tender 1   Right 4th PIP - Swollen 0   Right 5th PIP - Tender 1   Right 5th PIP - Swollen 0   Tender Joint Count (Total) 8   Swollen Joint Count (Total) 0   Patient Assessment (0-10) 5       DATA REVIEW    Prior medical records were reviewed and are summarized as below:    Laboratory data: summarized in the HPI    Imaging: summarized in the HPI. ASSESSMENT AND PLAN    1) Bilateral Hand Osteoarthritis. The patient has osteoarthritis, which is also known as \"wear and tear arthritis,\" non-inflammatory arthritis or mechanical arthritis.  There are hereditary, vocational and posttraumatic joint injuries predisposing factors. I recommend non-pharmacologic modalities such as keeping hands warm, avoid activities that case pain, warm water soaks, in addition to (2) pharmacologic modalities such as acetaminophen as first line, oral and topical NSAIDs as second line, such as diclofenac (Voltaren gel) which is now over the counter. Naproxen is a low PALMER-2 selectivity inhibitor that poses lower cardiovascular risk than other NSAIDs (Angiofelecia DJ, Eyal SANTIAGO. Clinical Pharmacology and Cardiovascular Safety of Naproxen. Am J Cardiovasc Drugs. 2016 Nov 8). NSAIDs should not be used in patients on blood thinners, chronic kidney disease, high risk coronary artery disease, and inflammatory bowel disease (ulcerative colitis or Crohn's disease) I recommended ball squeezing and holding until hand fatigue then alternating to other hand exercises 10 times twice daily. I cannot rule out an inflammatory process since she feels symptoms in the morning that improves as the day goes on, but I did not appreciate synovitis on exam. She has tenderness along her PIPs which have overlying osteoarthritis changes. I ordered labs and radiographs and will review with her over MyChart. 2) Right Shoulder Pain. She declined an injection. I referred her to orthopedic per her request.    The patient voiced understanding of the aforementioned assessment and plan. Summary of plan was provided in the After Visit Summary patient instructions. I also provided education about MyChart setup and utility. A total of 48 minutes was spent on this visit, reviewing interval notes, interval testing results, ordering tests, refilling medications, documenting the findings in the note, patient education, counseling, and coordination of care as described above. All questions asked and answered.     TODAY'S ORDERS    Orders Placed This Encounter    QUANTIFERON-TB GOLD PLUS    XR HAND LT MIN 3 V    XR HAND RT MIN 3 V    XR FOOT LT MIN 3 V    XR FOOT RT MIN 3 V    CYCLIC CITRUL PEPTIDE AB, IGG    CBC WITH AUTOMATED DIFF    METABOLIC PANEL, COMPREHENSIVE    CHRONIC HEPATITIS PANEL    C REACTIVE PROTEIN, QT    SED RATE (ESR)    RHEUMATOID FACTOR BY TURB (RDL)    PROTEIN ELECTROPHORESIS W/ REFLX MELISA    REFERRAL TO ORTHOPEDICS     No future appointments.     Yue Wilde MD, [address] [facility]    Adult Rheumatology   Rheumatology Ultrasound Certified  [address]  [facility], [address] [facility], [address]   Phone [phone]  Fax [phone]    Patient Care Team:  Carlos Queen MD as PCP - General (Family Medicine)  Carlos Queen MD as PCP - [facility]

## 2021-04-05 NOTE — LETTER
4/5/2021 Patient: Tamika Guzmán YOB: 1965 Date of Visit: 4/5/2021 Dani Bynumret [phone] [address] [employer] Nancy Pulido MD 
Miranda Skelton [phone] [address] [employer] Dear MD Nancy Bynum MD, Thank you for referring Ms. Tamika Guzmán to [facility] for evaluation. My notes for this consultation are attached. If you have questions, please do not hesitate to call me. I look forward to following your patient along with you.  
 
 
Sincerely, 
 
Hafsa Shafer MD

## 2021-04-25 DIAGNOSIS — J45.40 MODERATE PERSISTENT ASTHMA WITHOUT COMPLICATION: ICD-10-CM

## 2021-04-25 DIAGNOSIS — R56.9 SEIZURES (HCC): ICD-10-CM

## 2021-04-25 DIAGNOSIS — G89.4 CHRONIC PAIN SYNDROME: ICD-10-CM

## 2021-04-25 DIAGNOSIS — E78.5 HYPERLIPIDEMIA, UNSPECIFIED HYPERLIPIDEMIA TYPE: ICD-10-CM

## 2021-04-25 DIAGNOSIS — F31.70 BIPOLAR DISORDER IN PARTIAL REMISSION, MOST RECENT EPISODE UNSPECIFIED TYPE (HCC): ICD-10-CM

## 2021-04-25 DIAGNOSIS — E03.9 ACQUIRED HYPOTHYROIDISM: ICD-10-CM

## 2021-04-25 RX ORDER — DIVALPROEX SODIUM 500 MG/1
1000 TABLET, DELAYED RELEASE ORAL 2 TIMES DAILY
Qty: 360 TAB | Refills: 0 | Status: SHIPPED | OUTPATIENT
Start: 2021-04-25 | End: 2021-07-24

## 2021-04-25 RX ORDER — ARIPIPRAZOLE 15 MG/1
15 TABLET ORAL DAILY
Qty: 90 TAB | Refills: 1 | Status: SHIPPED | OUTPATIENT
Start: 2021-04-25 | End: 2021-09-22 | Stop reason: SDUPTHER

## 2021-04-26 RX ORDER — MONTELUKAST SODIUM 10 MG/1
10 TABLET ORAL DAILY
Qty: 90 TAB | Refills: 1 | Status: SHIPPED | OUTPATIENT
Start: 2021-04-26 | End: 2021-09-03 | Stop reason: SDUPTHER

## 2021-04-26 RX ORDER — PANTOPRAZOLE SODIUM 40 MG/1
40 TABLET, DELAYED RELEASE ORAL DAILY
Qty: 90 TAB | Refills: 1 | Status: SHIPPED | OUTPATIENT
Start: 2021-04-26 | End: 2021-09-03 | Stop reason: SDUPTHER

## 2021-04-26 RX ORDER — TIZANIDINE 4 MG/1
4 TABLET ORAL
Qty: 60 TAB | Refills: 0 | Status: SHIPPED | OUTPATIENT
Start: 2021-04-26 | End: 2021-11-27 | Stop reason: ALTCHOICE

## 2021-04-26 RX ORDER — LEVOTHYROXINE SODIUM 137 UG/1
137 TABLET ORAL
Qty: 90 TAB | Refills: 1 | Status: SHIPPED | OUTPATIENT
Start: 2021-04-26 | End: 2021-10-04 | Stop reason: SDUPTHER

## 2021-04-26 RX ORDER — TOPIRAMATE 100 MG/1
100 TABLET, FILM COATED ORAL DAILY
Qty: 90 TAB | Refills: 1 | Status: SHIPPED | OUTPATIENT
Start: 2021-04-26

## 2021-04-26 RX ORDER — ATORVASTATIN CALCIUM 80 MG/1
40 TABLET, FILM COATED ORAL DAILY
Qty: 90 TAB | Refills: 3 | Status: SHIPPED | OUTPATIENT
Start: 2021-04-26 | End: 2021-09-03 | Stop reason: SDUPTHER

## 2021-08-04 ENCOUNTER — TELEPHONE (OUTPATIENT)
Dept: FAMILY MEDICINE CLINIC | Age: 56
End: 2021-08-04

## 2021-08-09 RX ORDER — PANTOPRAZOLE SODIUM 40 MG/1
40 TABLET, DELAYED RELEASE ORAL DAILY
Qty: 90 TABLET | Refills: 1 | Status: CANCELLED | OUTPATIENT
Start: 2021-08-09

## 2021-08-11 NOTE — TELEPHONE ENCOUNTER
Spoke with Joanne Schofield, a pharmacy tech at the local [facility] on file for the patient. I have cancelled the refill request because the patient has 1 more refill for protonix left. Notified patient via [address]

## 2021-08-11 NOTE — TELEPHONE ENCOUNTER
Dr. Main/Telephone---8/11--Received: Today  Vaishali Gemini, [address] Message/Vendor Calls     Caller's first and last name: Huong Bonilla with [address]       Reason for call:  Requesting a call back from Krish Mcmillan to provide additional information that is now available.        Callback required yes/no and why:  yes       Best contact number(s):  [phone]       Details to clarify the request:  n/a       [place]

## 2021-08-20 NOTE — TELEPHONE ENCOUNTER
Returned call from the pharmacy. The patient needs a prior authorization for her protonix. The pharmacist will be faxing it over this afternoon.

## 2021-09-03 ENCOUNTER — OFFICE VISIT (OUTPATIENT)
Dept: FAMILY MEDICINE CLINIC | Age: 56
End: 2021-09-03
Payer: COMMERCIAL

## 2021-09-03 VITALS
WEIGHT: 183.6 LBS | OXYGEN SATURATION: 98 % | HEART RATE: 60 BPM | TEMPERATURE: 98.4 F | HEIGHT: 65 IN | RESPIRATION RATE: 12 BRPM | SYSTOLIC BLOOD PRESSURE: 114 MMHG | DIASTOLIC BLOOD PRESSURE: 74 MMHG | BODY MASS INDEX: 30.59 KG/M2

## 2021-09-03 DIAGNOSIS — J45.40 MODERATE PERSISTENT ASTHMA WITHOUT COMPLICATION: Primary | ICD-10-CM

## 2021-09-03 DIAGNOSIS — M79.7 FIBROMYALGIA: ICD-10-CM

## 2021-09-03 DIAGNOSIS — E78.5 HYPERLIPIDEMIA, UNSPECIFIED HYPERLIPIDEMIA TYPE: ICD-10-CM

## 2021-09-03 DIAGNOSIS — E03.9 ACQUIRED HYPOTHYROIDISM: ICD-10-CM

## 2021-09-03 DIAGNOSIS — K21.9 GASTROESOPHAGEAL REFLUX DISEASE WITHOUT ESOPHAGITIS: ICD-10-CM

## 2021-09-03 DIAGNOSIS — Z12.31 ENCOUNTER FOR SCREENING MAMMOGRAM FOR MALIGNANT NEOPLASM OF BREAST: ICD-10-CM

## 2021-09-03 DIAGNOSIS — G47.33 OBSTRUCTIVE SLEEP APNEA SYNDROME: ICD-10-CM

## 2021-09-03 DIAGNOSIS — F31.70 BIPOLAR DISORDER IN PARTIAL REMISSION, MOST RECENT EPISODE UNSPECIFIED TYPE (HCC): ICD-10-CM

## 2021-09-03 PROBLEM — M54.50 CHRONIC BILATERAL LOW BACK PAIN WITHOUT SCIATICA: Status: ACTIVE | Noted: 2021-09-03

## 2021-09-03 PROBLEM — G89.29 CHRONIC BILATERAL LOW BACK PAIN WITHOUT SCIATICA: Status: ACTIVE | Noted: 2021-09-03

## 2021-09-03 PROCEDURE — 99214 OFFICE O/P EST MOD 30 MIN: CPT | Performed by: FAMILY MEDICINE

## 2021-09-03 RX ORDER — FLUTICASONE PROPIONATE AND SALMETEROL XINAFOATE 230; 21 UG/1; UG/1
2 AEROSOL, METERED RESPIRATORY (INHALATION) 2 TIMES DAILY
Qty: 12 G | Refills: 2 | Status: SHIPPED | OUTPATIENT
Start: 2021-09-03

## 2021-09-03 RX ORDER — ATORVASTATIN CALCIUM 80 MG/1
40 TABLET, FILM COATED ORAL DAILY
Qty: 90 TABLET | Refills: 1 | Status: SHIPPED | OUTPATIENT
Start: 2021-09-03

## 2021-09-03 RX ORDER — MONTELUKAST SODIUM 10 MG/1
10 TABLET ORAL DAILY
Qty: 90 TABLET | Refills: 1 | Status: SHIPPED | OUTPATIENT
Start: 2021-09-03

## 2021-09-03 RX ORDER — DIVALPROEX SODIUM 500 MG/1
1000 TABLET, DELAYED RELEASE ORAL 2 TIMES DAILY
COMMUNITY
Start: 2021-08-19

## 2021-09-03 RX ORDER — ALBUTEROL SULFATE 90 UG/1
2 AEROSOL, METERED RESPIRATORY (INHALATION)
Qty: 1 EACH | Refills: 3 | Status: SHIPPED | OUTPATIENT
Start: 2021-09-03

## 2021-09-03 RX ORDER — PANTOPRAZOLE SODIUM 40 MG/1
40 TABLET, DELAYED RELEASE ORAL DAILY
Qty: 90 TABLET | Refills: 1 | Status: SHIPPED | OUTPATIENT
Start: 2021-09-03 | End: 2021-09-22

## 2021-09-03 NOTE — PROGRESS NOTES
1. Have you been to the ER, urgent care clinic since your last visit? Hospitalized since your last visit? No    2. Have you seen or consulted any other health care providers outside of the [address] [place] [address] since your last visit? Include any pap smears or colon screening. No    Reviewed record in preparation for visit and have necessary documentation  Pt did not bring medication to office visit for review  Patient is accompanied by self I have received verbal consent from [facility] to discuss any/all medical information while they are present in the room.     Goals that were addressed and/or need to be completed during or after this appointment include     Health Maintenance Due   Topic Date Due    Hepatitis C Screening  Never done    Pneumococcal 0-64 years (1 of 2 - PPSV23) Never done    COVID-19 Vaccine (1) Never done    DTaP/Tdap/Td series (1 - Tdap) Never done    PAP AKA CERVICAL CYTOLOGY  Never done    Colorectal Cancer Screening Combo  Never done    Shingrix Vaccine Age 50> (1 of 2) Never done    Flu Vaccine (1) Never done    Lipid Screen  08/26/2021

## 2021-09-03 NOTE — PROGRESS NOTES
[facility]    History of Present Illness:   Peter Moulton is a 55 y.o. female with history of AIDE, RA, Asthma, Bipolar, Seizures, HLD, Anemia. CC: Establish Care  History provided by patient and Records    HPI:  AIDE: Known AIDE, but last study at least 3 years ago. Been in [place] for 1 year and needs new CPAP    Asthma Follow up: The patient is being seen for follow up of asthma, not currently in exacerbation. Has been off of Advair, Singulair, and Albuterol   Asthma symptoms occur: monthly, Weather changes. Wheezing when present is described as mild and easily relieved with rescue bronchodilator. Current medications include Advair and ALbuterol. Does the patient have a Nebulizer? no  Does the patient have a spacer? no  Always use spacer with inhaler? no  Regimen compliance: The patient reports adherence to this regimen. Frequency of use of quick-relief meds: Infrequent. Current limitations in activity from asthma: none. Number of days of school or work missed in the last month: 0. Does the patient have an Asthma action plan? yes     GERD: Has not been taking Protonix. Fibromyalgia: Was on Lyrica, does not want to be back on. Health Maintenance  Health Maintenance Due   Topic Date Due    Hepatitis C Screening  Never done    Pneumococcal 0-64 years (1 of 2 - PPSV23) Never done    COVID-19 Vaccine (1) Never done    DTaP/Tdap/Td series (1 - Tdap) Never done    PAP AKA CERVICAL CYTOLOGY  Never done    Colorectal Cancer Screening Combo  Never done    Shingrix Vaccine Age 50> (1 of 2) Never done    Flu Vaccine (1) Never done    Lipid Screen  08/26/2021       Past Medical, Family, and Social History:     Current Outpatient Medications on File Prior to Visit   Medication Sig Dispense Refill    divalproex DR (DEPAKOTE) 500 mg tablet Take 1,000 mg by mouth two (2) times a day.  levothyroxine (SYNTHROID) 137 mcg tablet Take 137 mcg by mouth Daily (before breakfast).  80 Tab 1    topiramate (TOPAMAX) 100 mg tablet Take 1 Tab by mouth daily. 90 Tab 1    tiZANidine (ZANAFLEX) 4 mg tablet Take 1 Tab by mouth three (3) times daily as needed for Muscle Spasm(s). 60 Tab 0    ARIPiprazole (Abilify) 15 mg tablet Take 1 Tab by mouth daily. 90 Tab 1    [DISCONTINUED] atorvastatin (LIPITOR) 80 mg tablet Take 0.5 Tabs by mouth daily. 90 Tab 3    [DISCONTINUED] pantoprazole (PROTONIX) 40 mg tablet Take 1 Tab by mouth daily. 90 Tab 1    [DISCONTINUED] montelukast (SINGULAIR) 10 mg tablet Take 1 Tab by mouth daily. 90 Tab 1    pregabalin (Lyrica) 50 mg capsule Take 50 mg by mouth every twelve (12) hours.  [DISCONTINUED] albuterol sulfate (PROVENTIL IN) Take  by inhalation as needed.  Arm Brace (Wrist Brace) misc Apply to right wrist as needed for pain 1 Each 0     No current facility-administered medications on file prior to visit. 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    Moderate persistent asthma without complication L70.87    Current every day smoker F17.200    Chronic pain syndrome G89.4    Chronic bilateral low back pain without sciatica M54.5, G89.29       Social History     Socioeconomic History    Marital status: SINGLE     Spouse name: Not on file    Number of children: 2    Years of education: Not on file    Highest education level: GED or equivalent   Occupational History    Not on file   Tobacco Use    Smoking status: Current Every Day Smoker     Packs/day: 0.50     Types: Cigarettes    Smokeless tobacco: Never Used   Substance and Sexual Activity    Alcohol use:  Yes    Drug use: Yes     Frequency: 7.0 times per week     Types: Marijuana    Sexual activity: Yes Partners: Female, Male     Birth control/protection: Condom   Other Topics Concern    Not on file   Social History Narrative    Not on file     Social Determinants of Health     Financial Resource Strain: High Risk    Difficulty of Paying Living Expenses: Hard   Food Insecurity: No Food Insecurity    Worried About Running Out of Food in the Last Year: Never true    Endy of Food in the Last Year: Never true   Transportation Needs: No Transportation Needs    Lack of Transportation (Medical): No    Lack of Transportation (Non-Medical): No   Physical Activity:     Days of Exercise per Week:     Minutes of Exercise per Session:    Stress:     Feeling of Stress :    Social Connections:     Frequency of Communication with Friends and Family:     Frequency of Social Gatherings with Friends and Family:     Attends Christianity Services:     Active Member of Clubs or Organizations:     Attends Club or Organization Meetings:     Marital Status:    Intimate Partner Violence:     Fear of Current or Ex-Partner:     Emotionally Abused:     Physically Abused:     Sexually Abused:        Review of Systems   Review of Systems   Constitutional: Negative for chills and fever. Cardiovascular: Negative for chest pain and palpitations. Gastrointestinal: Negative for nausea and vomiting. Objective:     Visit Vitals  /74   Pulse 60   Temp 98.4 °F (36.9 °C) (Temporal)   Resp 12   Ht 5' 5\" (1.651 m)   Wt 183 lb 9.6 oz (83.3 kg)   SpO2 98%   BMI 30.55 kg/m²        Physical Exam    Pertinent Labs/Studies:      Assessment and orders:       ICD-10-CM ICD-9-CM    1. Moderate persistent asthma without complication  H12.63 994.12 albuterol (PROVENTIL HFA, VENTOLIN HFA, PROAIR HFA) 90 mcg/actuation inhaler      fluticasone propion-salmeteroL (Advair HFA) 230-21 mcg/actuation inhaler      montelukast (SINGULAIR) 10 mg tablet   2. Obstructive sleep apnea syndrome  G47.33 327.23    3.  Acquired hypothyroidism  E03.9 244.9 TSH 3RD GENERATION   4. Hyperlipidemia, unspecified hyperlipidemia type  E78.5 272.4 CBC W/O DIFF      METABOLIC PANEL, COMPREHENSIVE      LIPID PANEL      atorvastatin (LIPITOR) 80 mg tablet   5. Bipolar disorder in partial remission, most recent episode unspecified type ([facility]  F31.70 296.80    6. Gastroesophageal reflux disease without esophagitis  K21.9 530.81 pantoprazole (PROTONIX) 40 mg tablet   7. Fibromyalgia  M79.7 729.1 VITAMIN B12      VITAMIN D, 25 HYDROXY   8. Encounter for screening mammogram for malignant neoplasm of breast  Z12.31 V76.12 [facility] MAMMO BI SCREENING INCL CAD     Diagnoses and all orders for this visit:    1. Moderate persistent asthma without complication  -     albuterol (PROVENTIL HFA, VENTOLIN HFA, PROAIR HFA) 90 mcg/actuation inhaler; Take 2 Puffs by inhalation every four (4) hours as needed for Wheezing.  -     fluticasone propion-salmeteroL (Advair HFA) 230-21 mcg/actuation inhaler; Take 2 Puffs by inhalation two (2) times a day. -     montelukast (SINGULAIR) 10 mg tablet; Take 1 Tablet by mouth daily. 2. Obstructive sleep apnea syndrome: Needs New C-pap, Paperwork of last study from 2019, may need New study, will see. 3. Acquired hypothyroidism: Labs  -     TSH 3RD GENERATION; Future    4. Hyperlipidemia, unspecified hyperlipidemia type: Labs and refills. The patient is aware of our goal to reduce or eliminate the long term problems (such as strokes and heart attacks) related to poorly controlled Triglycerides, LDL, Cholesterol.   -     CBC W/O DIFF; Future  -     METABOLIC PANEL, COMPREHENSIVE; Future  -     LIPID PANEL; Future  -     atorvastatin (LIPITOR) 80 mg tablet; Take 0.5 Tablets by mouth daily. 5. Bipolar disorder in partial remission, most recent episode unspecified type [facility]): Stable at this time    6. Gastroesophageal reflux disease without esophagitis: Refill  -     pantoprazole (PROTONIX) 40 mg tablet; Take 1 Tablet by mouth daily.       Follow-up and Dispositions    · Return in about 3 months (around 12/3/2021) for Does need PAP Testaand would like Female provider. I have discussed the diagnosis with the patient and the intended plan as seen in the above orders. Social history, medical history, and labs were reviewed. The patient has received an after-visit summary and questions were answered concerning future plans. I have discussed medication side effects and warnings with the patient as well.     MD KIRSTEN Clarie & SEAMUS KEITH [facility]  09/03/21

## 2021-09-07 ENCOUNTER — TELEPHONE (OUTPATIENT)
Dept: FAMILY MEDICINE CLINIC | Age: 56
End: 2021-09-07

## 2021-09-07 NOTE — TELEPHONE ENCOUNTER
Pt insurance will only cover 1 90 day supply once a year. Pt has already received that 90 day supply. Pharmacy states that provider can do a prior auth.

## 2021-09-22 ENCOUNTER — OFFICE VISIT (OUTPATIENT)
Dept: FAMILY MEDICINE CLINIC | Age: 56
End: 2021-09-22
Payer: COMMERCIAL

## 2021-09-22 VITALS
HEART RATE: 58 BPM | WEIGHT: 179 LBS | SYSTOLIC BLOOD PRESSURE: 94 MMHG | RESPIRATION RATE: 22 BRPM | HEIGHT: 65 IN | BODY MASS INDEX: 29.82 KG/M2 | TEMPERATURE: 97.7 F | OXYGEN SATURATION: 98 % | DIASTOLIC BLOOD PRESSURE: 60 MMHG

## 2021-09-22 DIAGNOSIS — F41.9 ANXIETY: ICD-10-CM

## 2021-09-22 DIAGNOSIS — E55.9 VITAMIN D DEFICIENCY: ICD-10-CM

## 2021-09-22 DIAGNOSIS — F31.70 BIPOLAR DISORDER IN PARTIAL REMISSION, MOST RECENT EPISODE UNSPECIFIED TYPE (HCC): Primary | ICD-10-CM

## 2021-09-22 PROCEDURE — 99214 OFFICE O/P EST MOD 30 MIN: CPT | Performed by: FAMILY MEDICINE

## 2021-09-22 RX ORDER — HYDROXYZINE 25 MG/1
TABLET, FILM COATED ORAL
Qty: 60 TABLET | Refills: 1 | Status: SHIPPED | OUTPATIENT
Start: 2021-09-22 | End: 2021-11-04

## 2021-09-22 RX ORDER — ERGOCALCIFEROL 1.25 MG/1
50000 CAPSULE ORAL
Qty: 13 CAPSULE | Refills: 1 | Status: SHIPPED | OUTPATIENT
Start: 2021-09-22

## 2021-09-22 RX ORDER — ARIPIPRAZOLE 15 MG/1
15 TABLET ORAL DAILY
Qty: 90 TABLET | Refills: 0 | Status: SHIPPED | OUTPATIENT
Start: 2021-09-22

## 2021-09-22 NOTE — PROGRESS NOTES
[facility]    History of Present Illness:   Jeffrey Dee is a 55 y.o. female with history of Bipolar, Seizures, HLD, Chronic Pain, rheumatic, acquired Hypothyroidism, AIDE, Asthma  CC: Anxiety   History provided by patient and Records    HPI:  Patient reports 2 weeks of severe anxiety with panic attacks (hyperventilating) that occur at random per patient also with racing thoughts and worrying about random things. Noting racing thoughts that keep her up at night in particular. Noting has been off of Abilify for about a month now. Also noting has AIDE and has not had C-PAP since moving from [place]. Patient is very upset about this. Health Maintenance  Health Maintenance Due   Topic Date Due    Hepatitis C Screening  Never done    Pneumococcal 0-64 years (1 of 2 - PPSV23) Never done    COVID-19 Vaccine (1) Never done    DTaP/Tdap/Td series (1 - Tdap) Never done    Cervical cancer screen  Never done    Colorectal Cancer Screening Combo  Never done    Shingrix Vaccine Age 50> (1 of 2) Never done    Flu Vaccine (1) Never done       Past Medical, Family, and Social History:     Current Outpatient Medications on File Prior to Visit   Medication Sig Dispense Refill    divalproex DR (DEPAKOTE) 500 mg tablet Take 1,000 mg by mouth two (2) times a day.  albuterol (PROVENTIL HFA, VENTOLIN HFA, PROAIR HFA) 90 mcg/actuation inhaler Take 2 Puffs by inhalation every four (4) hours as needed for Wheezing. 1 Each 3    fluticasone propion-salmeteroL (Advair HFA) 230-21 mcg/actuation inhaler Take 2 Puffs by inhalation two (2) times a day. 12 g 2    montelukast (SINGULAIR) 10 mg tablet Take 1 Tablet by mouth daily. 90 Tablet 1    atorvastatin (LIPITOR) 80 mg tablet Take 0.5 Tablets by mouth daily. 90 Tablet 1    levothyroxine (SYNTHROID) 137 mcg tablet Take 137 mcg by mouth Daily (before breakfast). 90 Tab 1    topiramate (TOPAMAX) 100 mg tablet Take 1 Tab by mouth daily.  90 Tab 1  tiZANidine (ZANAFLEX) 4 mg tablet Take 1 Tab by mouth three (3) times daily as needed for Muscle Spasm(s). 60 Tab 0    pantoprazole (PROTONIX) 40 mg tablet Take 1 Tablet by mouth daily. (Patient not taking: Reported on 9/22/2021) 90 Tablet 1    ARIPiprazole (Abilify) 15 mg tablet Take 1 Tab by mouth daily. (Patient not taking: Reported on 9/22/2021) 90 Tab 1     No current facility-administered medications on file prior to visit. Patient Active Problem List   Diagnosis Code    Obstructive sleep apnea syndrome G47.33    Acquired hypothyroidism E03.9    Bipolar disorder in partial remission ([facility] [address] F31.70    Seizures ([facility] [address] R56.9    Hyperlipidemia E78.5    Anemia D64.9    Bradycardia R00.1    Bilateral hearing loss H91.93    Rheumatic arteritis I00    Moderate persistent asthma without complication T82.23    Current every day smoker F17.200    Chronic pain syndrome G89.4    Chronic bilateral low back pain without sciatica M54.5, G89.29       Social History     Socioeconomic History    Marital status: SINGLE     Spouse name: Not on file    Number of children: 2    Years of education: Not on file    Highest education level: GED or equivalent   Occupational History    Not on file   Tobacco Use    Smoking status: Current Every Day Smoker     Packs/day: 0.50     Types: Cigarettes    Smokeless tobacco: Never Used   Substance and Sexual Activity    Alcohol use:  Yes    Drug use: Yes     Frequency: 7.0 times per week     Types: Marijuana    Sexual activity: Yes     Partners: Female, Male     Birth control/protection: Condom   Other Topics Concern    Not on file   Social History Narrative    Not on file     Social Determinants of Health     Financial Resource Strain: High Risk    Difficulty of Paying Living Expenses: Hard   Food Insecurity: No Food Insecurity    Worried About Running Out of Food in the Last Year: Never true    Endy of Food in the Last Year: Never true   Transportation Needs: No Transportation Needs    Lack of Transportation (Medical): No    Lack of Transportation (Non-Medical): No   Physical Activity:     Days of Exercise per Week:     Minutes of Exercise per Session:    Stress:     Feeling of Stress :    Social Connections:     Frequency of Communication with Friends and Family:     Frequency of Social Gatherings with Friends and Family:     Attends Alevism Services:     Active Member of Clubs or Organizations:     Attends Club or Organization Meetings:     Marital Status:    Intimate Partner Violence:     Fear of Current or Ex-Partner:     Emotionally Abused:     Physically Abused:     Sexually Abused:        Review of Systems   Review of Systems   Constitutional: Negative for chills and fever. HENT: Negative for nosebleeds. Cardiovascular: Negative for chest pain and palpitations. Gastrointestinal: Negative for nausea and vomiting. Neurological: Negative for dizziness and headaches. Psychiatric/Behavioral: Negative for substance abuse and suicidal ideas. The patient is nervous/anxious and has insomnia. Objective:     Visit Vitals  BP 94/60 (BP 1 Location: Right arm, BP Patient Position: Sitting, BP Cuff Size: Adult)   Pulse (!) 58   Temp 97.7 °F (36.5 °C) (Oral)   Resp 22   Ht 5' 5\" (1.651 m)   Wt 179 lb (81.2 kg)   SpO2 98%   BMI 29.79 kg/m²        Physical Exam  Vitals and nursing note reviewed. Constitutional:       Appearance: Normal appearance. HENT:      Head: Normocephalic and atraumatic. Cardiovascular:      Rate and Rhythm: Normal rate and regular rhythm. Pulses: Normal pulses. Heart sounds: Normal heart sounds. No murmur heard. No friction rub. No gallop. Pulmonary:      Effort: Pulmonary effort is normal.      Breath sounds: Normal breath sounds. Abdominal:      General: Abdomen is flat. Bowel sounds are normal.      Palpations: Abdomen is soft. Musculoskeletal:         General: Normal range of motion. Cervical back: Normal range of motion and neck supple. Skin:     General: Skin is warm and dry. Neurological:      Mental Status: She is alert. Psychiatric:         Attention and Perception: Attention normal.         Mood and Affect: Mood is anxious and depressed. Speech: Speech normal.         Behavior: Behavior is cooperative. Thought Content: Thought content normal.         Cognition and Memory: Cognition normal.         Judgment: Judgment normal.         Pertinent Labs/Studies:      Assessment and orders:       ICD-10-CM ICD-9-CM    1. Bipolar disorder in partial remission, most recent episode unspecified type ([facility]  F31.70 296.80 ARIPiprazole (Abilify) 15 mg tablet   2. Anxiety  F41.9 300.00 hydrOXYzine HCL (ATARAX) 25 mg tablet   3. Vitamin D deficiency  E55.9 268.9 ergocalciferol (ERGOCALCIFEROL) 1,250 mcg (50,000 unit) capsule     Diagnoses and all orders for this visit:    1. Bipolar disorder in partial remission, most recent episode unspecified type [facility]): Restarting Abilify, and adding Hydroxyzine now. Follow up closely. -     ARIPiprazole (Abilify) 15 mg tablet; Take 1 Tablet by mouth daily. 2. Anxiety  -     hydrOXYzine HCL (ATARAX) 25 mg tablet; Take 1-2 tablets by mouth every 8 hours as needed for panic attacks or sleep    3. Vitamin D deficiency  -     ergocalciferol (ERGOCALCIFEROL) 1,250 mcg (50,000 unit) capsule; Take 1 Capsule by mouth every seven (7) days. Follow-up and Dispositions    · Return in about 4 weeks (around 10/20/2021) for Follow up Pap testing. I have discussed the diagnosis with the patient and the intended plan as seen in the above orders. Social history, medical history, and labs were reviewed. The patient has received an after-visit summary and questions were answered concerning future plans. I have discussed medication side effects and warnings with the patient as well.     Maribel Shook MD  [facility]  09/22/21

## 2021-09-22 NOTE — PROGRESS NOTES
1. Have you been to the ER, urgent care clinic since your last visit? Hospitalized since your last visit? No    2. Have you seen or consulted any other health care providers outside of the [address] [place] [address] since your last visit? Include any pap smears or colon screening. No    Reviewed record in preparation for visit and have necessary documentation  Goals that were addressed and/or need to be completed during or after this appointment include     Health Maintenance Due   Topic Date Due    Hepatitis C Screening  Never done    Pneumococcal 0-64 years (1 of 2 - PPSV23) Never done    COVID-19 Vaccine (1) Never done    DTaP/Tdap/Td series (1 - Tdap) Never done    Cervical cancer screen  Never done    Colorectal Cancer Screening Combo  Never done    Shingrix Vaccine Age 50> (1 of 2) Never done    Flu Vaccine (1) Never done       Patient is accompanied by self I have [facility] to discuss any/all medical information while they are present in the room.

## 2021-09-27 ENCOUNTER — TELEPHONE (OUTPATIENT)
Dept: FAMILY MEDICINE CLINIC | Age: 56
End: 2021-09-27

## 2021-09-27 NOTE — TELEPHONE ENCOUNTER
Pt states that thee sleep study to to expensive to do at home.  Can you please send to someone in her network

## 2021-09-29 ENCOUNTER — TELEPHONE (OUTPATIENT)
Dept: FAMILY MEDICINE CLINIC | Age: 56
End: 2021-09-29

## 2021-09-30 NOTE — TELEPHONE ENCOUNTER
Patient called per Dr. David Gar:  when her next follow up with Psychiatry is, and has she spoken to them about her increased anxiety? I do not want to change around medications significantly that is regularly managed by psychiatry. Patient verbalized understanding and appreciative.     Patient states her next follow up is in November, she has not spoken to them about her increased anxiety

## 2021-10-02 DIAGNOSIS — E03.9 ACQUIRED HYPOTHYROIDISM: ICD-10-CM

## 2021-10-02 RX ORDER — LEVOTHYROXINE SODIUM 137 UG/1
137 TABLET ORAL
Qty: 90 TABLET | Refills: 1 | Status: CANCELLED | OUTPATIENT
Start: 2021-10-02

## 2021-10-04 ENCOUNTER — OFFICE VISIT (OUTPATIENT)
Dept: FAMILY MEDICINE CLINIC | Age: 56
End: 2021-10-04
Payer: COMMERCIAL

## 2021-10-04 ENCOUNTER — HOSPITAL ENCOUNTER (OUTPATIENT)
Dept: LAB | Age: 56
Discharge: HOME | End: 2021-10-04
Payer: COMMERCIAL

## 2021-10-04 VITALS
SYSTOLIC BLOOD PRESSURE: 104 MMHG | DIASTOLIC BLOOD PRESSURE: 62 MMHG | TEMPERATURE: 97.5 F | HEIGHT: 65 IN | RESPIRATION RATE: 20 BRPM | HEART RATE: 89 BPM | BODY MASS INDEX: 29.66 KG/M2 | WEIGHT: 178 LBS | OXYGEN SATURATION: 100 %

## 2021-10-04 DIAGNOSIS — Z12.4 CERVICAL CANCER SCREENING: ICD-10-CM

## 2021-10-04 DIAGNOSIS — E03.9 ACQUIRED HYPOTHYROIDISM: ICD-10-CM

## 2021-10-04 DIAGNOSIS — G47.33 OSA (OBSTRUCTIVE SLEEP APNEA): ICD-10-CM

## 2021-10-04 DIAGNOSIS — Z12.11 COLON CANCER SCREENING: ICD-10-CM

## 2021-10-04 DIAGNOSIS — Z01.419 WELL WOMAN EXAM WITH ROUTINE GYNECOLOGICAL EXAM: Primary | ICD-10-CM

## 2021-10-04 PROCEDURE — 88175 CYTOPATH C/V AUTO FLUID REDO: CPT

## 2021-10-04 PROCEDURE — 99396 PREV VISIT EST AGE 40-64: CPT | Performed by: FAMILY MEDICINE

## 2021-10-04 PROCEDURE — 87624 HPV HI-RISK TYP POOLED RSLT: CPT

## 2021-10-04 RX ORDER — LEVOTHYROXINE SODIUM 137 UG/1
137 TABLET ORAL
Qty: 90 TABLET | Refills: 1 | Status: SHIPPED | OUTPATIENT
Start: 2021-10-04

## 2021-10-04 NOTE — PROGRESS NOTES
1. Have you been to the ER, urgent care clinic since your last visit? Hospitalized since your last visit? No    2. Have you seen or consulted any other health care providers outside of the [address] [place] [address] since your last visit? Include any pap smears or colon screening.  No  Reviewed record in preparation for visit and have necessary documentation  Pt did not bring medication to office visit for review    Goals that were addressed and/or need to be completed during or after this appointment include   Health Maintenance Due   Topic Date Due    Hepatitis C Screening  Never done    Pneumococcal 0-64 years (1 of 2 - PPSV23) Never done    COVID-19 Vaccine (1) Never done    DTaP/Tdap/Td series (1 - Tdap) Never done    Cervical cancer screen  Never done    Colorectal Cancer Screening Combo  Never done    Shingrix Vaccine Age 50> (1 of 2) Never done    Flu Vaccine (1) Never done

## 2021-10-04 NOTE — PROGRESS NOTES
[facility] [place]    History of Present Illness:   Anand Lovelace is a 55 y.o. female with history of Rheumatic Arteritis, Hypothyroid, AIDE, Asthma, Bipolar, HLD, Seizures, Chronic Pain, Anxiety  CC: Well woman exam  History provided by patient and Records    HPI:    Well Woman exam:  Anand Lovelace is a 55 y.o. female presenting for well woman exam.     How would you rate your health in generally over the last year? Good  Has your physical and emotional health limited your social activities with family or friends? yes  Ability to perform activities of daily living? completes all daily living activities without any functional limitations except transportation    Current Complaints? Anxiety (See attached Problem visit note if applicable)    Menstrual/Sexual History:  Age at which menses began: 16 y.o. PAP History: Normal      Sexually active: Yes  Number of sexual partners: 2  Type of sexual partners: Male  Method of family planning: Hysterectomy    Risk factors for breast cancer: Low    Diet/Exercise History:  Diet: Favorite food Macaron[employer] Cheese. - Does patient eat at least 5 servings of Fruits/vegetables daily? No   - Is patient currently dieting? No    Exercise: Patient does not have structured exercise  - Does patient get 150 minutes of structured exercise weekly? No  - Type of work patient has? retired  - Limitations to exercise? NOne    Healthcare maintenance:   Health Maintenance Due   Topic Date Due    Hepatitis C Screening  Never done    Pneumococcal 0-64 years (1 of 2 - PPSV23) Never done    COVID-19 Vaccine (1) Never done    DTaP/Tdap/Td series (1 - Tdap) Never done    Cervical cancer screen  Never done    Colorectal Cancer Screening Combo  Never done    Shingrix Vaccine Age 50> (1 of 2) Never done    Flu Vaccine (1) Never done     Mammogram indicated? No   Colonoscopy indicated? Yes  DEXA scan indicated? No   HIV/STI testing indicated?   No   Hepatitis C testing indicated? Yes  Lung cancer screening indicated? No   AAA screening indicated? No       There is no immunization history on file for this patient. Flu indicated? Yes  Tdap indicated? Yes  Pneumovax indicated? Yes  Zostervax indicated? Yes  Meningococcal indicated? No       Medications Reviewed  Current Outpatient Medications on File Prior to Visit   Medication Sig Dispense Refill    ARIPiprazole (Abilify) 15 mg tablet Take 1 Tablet by mouth daily. 90 Tablet 0    hydrOXYzine HCL (ATARAX) 25 mg tablet Take 1-2 tablets by mouth every 8 hours as needed for panic attacks or sleep 60 Tablet 1    ergocalciferol (ERGOCALCIFEROL) 1,250 mcg (50,000 unit) capsule Take 1 Capsule by mouth every seven (7) days. 13 Capsule 1    divalproex DR (DEPAKOTE) 500 mg tablet Take 1,000 mg by mouth two (2) times a day.  albuterol (PROVENTIL HFA, VENTOLIN HFA, PROAIR HFA) 90 mcg/actuation inhaler Take 2 Puffs by inhalation every four (4) hours as needed for Wheezing. 1 Each 3    fluticasone propion-salmeteroL (Advair HFA) 230-21 mcg/actuation inhaler Take 2 Puffs by inhalation two (2) times a day. 12 g 2    montelukast (SINGULAIR) 10 mg tablet Take 1 Tablet by mouth daily. 90 Tablet 1    atorvastatin (LIPITOR) 80 mg tablet Take 0.5 Tablets by mouth daily. 90 Tablet 1    topiramate (TOPAMAX) 100 mg tablet Take 1 Tab by mouth daily. 90 Tab 1    tiZANidine (ZANAFLEX) 4 mg tablet Take 1 Tab by mouth three (3) times daily as needed for Muscle Spasm(s). 60 Tab 0    [DISCONTINUED] levothyroxine (SYNTHROID) 137 mcg tablet Take 137 mcg by mouth Daily (before breakfast). 90 Tab 1     No current facility-administered medications on file prior to visit. Past Medical, Family, and Social History: Allergies Reviewed:   Allergies   Allergen Reactions    Ibuprofen Swelling       Medical History Reviewed  Current Outpatient Medications on File Prior to Visit   Medication Sig Dispense Refill    ARIPiprazole (Abilify) 15 mg tablet Take 1 Tablet by mouth daily. 90 Tablet 0    hydrOXYzine HCL (ATARAX) 25 mg tablet Take 1-2 tablets by mouth every 8 hours as needed for panic attacks or sleep 60 Tablet 1    ergocalciferol (ERGOCALCIFEROL) 1,250 mcg (50,000 unit) capsule Take 1 Capsule by mouth every seven (7) days. 13 Capsule 1    divalproex DR (DEPAKOTE) 500 mg tablet Take 1,000 mg by mouth two (2) times a day.  albuterol (PROVENTIL HFA, VENTOLIN HFA, PROAIR HFA) 90 mcg/actuation inhaler Take 2 Puffs by inhalation every four (4) hours as needed for Wheezing. 1 Each 3    fluticasone propion-salmeteroL (Advair HFA) 230-21 mcg/actuation inhaler Take 2 Puffs by inhalation two (2) times a day. 12 g 2    montelukast (SINGULAIR) 10 mg tablet Take 1 Tablet by mouth daily. 90 Tablet 1    atorvastatin (LIPITOR) 80 mg tablet Take 0.5 Tablets by mouth daily. 90 Tablet 1    topiramate (TOPAMAX) 100 mg tablet Take 1 Tab by mouth daily. 90 Tab 1    tiZANidine (ZANAFLEX) 4 mg tablet Take 1 Tab by mouth three (3) times daily as needed for Muscle Spasm(s). 60 Tab 0    [DISCONTINUED] levothyroxine (SYNTHROID) 137 mcg tablet Take 137 mcg by mouth Daily (before breakfast). 90 Tab 1     No current facility-administered medications on file prior to visit.        Surgical History Reviewed  Past Surgical History:   Procedure Laterality Date    HX BLADDER SUSPENSION      HX CARPAL TUNNEL RELEASE      x 3 on right hand    HX HYSTERECTOMY      No idea what was left behind, for fibroids    HX TUMOR REMOVAL      As a child, right side of neck       Family History Reviewed  Family History   Problem Relation Age of Onset    Heart Attack Mother 54    Diabetes Mother     Hypertension Mother    [facility] Arthritis-rheumatoid Mother     Heart Attack Father 48    Seizures Father     Psychiatric Disorder Father     Other Sister         AIDS    Other Brother         blood clot       Social History Reviewed  Social History     Socioeconomic History    Marital status: SINGLE     Spouse name: Not on file    Number of children: 2    Years of education: Not on file    Highest education level: GED or equivalent   Occupational History    Not on file   Tobacco Use    Smoking status: Current Every Day Smoker     Packs/day: 0.50     Types: Cigarettes    Smokeless tobacco: Never Used   Substance and Sexual Activity    Alcohol use: Yes    Drug use: Yes     Frequency: 7.0 times per week     Types: Marijuana    Sexual activity: Yes     Partners: Female, Male     Birth control/protection: Condom   Other Topics Concern    Not on file   Social History Narrative    Not on file     Social Determinants of Health     Financial Resource Strain: High Risk    Difficulty of Paying Living Expenses: Hard   Food Insecurity: No Food Insecurity    Worried About Running Out of Food in the Last Year: Never true    Endy of Food in the Last Year: Never true   Transportation Needs: No Transportation Needs    Lack of Transportation (Medical): No    Lack of Transportation (Non-Medical): No   Physical Activity:     Days of Exercise per Week:     Minutes of Exercise per Session:    Stress:     Feeling of Stress :    Social Connections:     Frequency of Communication with Friends and Family:     Frequency of Social Gatherings with Friends and Family:     Attends Spiritism Services:     Active Member of Clubs or Organizations:     Attends Club or Organization Meetings:     Marital Status:    Intimate Partner Violence:     Fear of Current or Ex-Partner:     Emotionally Abused:     Physically Abused:     Sexually Abused:        Review of Systems   Review of Systems   Psychiatric/Behavioral: The patient is nervous/anxious. Objective:     Visit Vitals  /62   Pulse 89   Temp 97.5 °F (36.4 °C)   Resp 20   Ht 5' 5\" (1.651 m)   Wt 178 lb (80.7 kg)   SpO2 100%   BMI 29.62 kg/m²        Physical Exam  Vitals and nursing note reviewed.  Exam conducted with a chaperone present. Constitutional:       Appearance: Normal appearance. HENT:      Head: Normocephalic and atraumatic. Cardiovascular:      Rate and Rhythm: Normal rate and regular rhythm. Pulses: Normal pulses. Heart sounds: Normal heart sounds. No murmur heard. No friction rub. No gallop. Pulmonary:      Effort: Pulmonary effort is normal.      Breath sounds: Normal breath sounds. Abdominal:      General: Abdomen is flat. Bowel sounds are normal.      Palpations: Abdomen is soft. Genitourinary:     Pubic Area: No rash. Labia:         Right: No rash or tenderness. Left: No rash or tenderness. Vagina: Normal.      Cervix: Normal.   Musculoskeletal:      Cervical back: Normal range of motion and neck supple. Neurological:      Mental Status: She is alert. Pertinent Labs/Studies:  Lab Results   Component Value Date/Time    Cholesterol, total 231 (H) 09/07/2021 09:55 AM    HDL Cholesterol 40 09/07/2021 09:55 AM    LDL, calculated 146.6 (H) 09/07/2021 09:55 AM    VLDL, calculated 44.4 09/07/2021 09:55 AM    Triglyceride 222 (H) 09/07/2021 09:55 AM    CHOL/HDL Ratio 5.8 (H) 09/07/2021 09:55 AM     Lab Results   Component Value Date/Time    Sodium 147 (H) 09/07/2021 09:55 AM    Potassium 3.8 09/07/2021 09:55 AM    Chloride 116 (H) 09/07/2021 09:55 AM    CO2 24 09/07/2021 09:55 AM    Anion gap 7 09/07/2021 09:55 AM    Glucose 109 (H) 09/07/2021 09:55 AM    BUN 14 09/07/2021 09:55 AM    Creatinine 0.87 09/07/2021 09:55 AM    BUN/Creatinine ratio 16 09/07/2021 09:55 AM    GFR est AA >60 09/07/2021 09:55 AM    GFR est non-AA >60 09/07/2021 09:55 AM    Calcium 8.9 09/07/2021 09:55 AM    Bilirubin, total 0.2 09/07/2021 09:55 AM    Alk.  phosphatase 83 09/07/2021 09:55 AM    Protein, total 7.2 09/07/2021 09:55 AM    Albumin 3.4 (L) 09/07/2021 09:55 AM    Globulin 3.8 09/07/2021 09:55 AM    A-G Ratio 0.9 (L) 09/07/2021 09:55 AM    ALT (SGPT) 20 09/07/2021 09:55 AM     Lab Results Component Value Date/Time    WBC 13.6 (H) 09/07/2021 09:55 AM    Hemoglobin (POC) 12 01/25/2021 02:15 PM    HGB 12.3 09/07/2021 09:55 AM    HCT 42.4 09/07/2021 09:55 AM    PLATELET 288 76/71/1640 09:55 AM    MCV 95.7 09/07/2021 09:55 AM         Assessment and orders:       ICD-10-CM ICD-9-CM    1. Well woman exam with routine gynecological exam  Z01.419 V72.31    2. Colon cancer screening  Z12.11 V76.51 REFERRAL TO GASTROENTEROLOGY   3. AIDE (obstructive sleep apnea)  G47.33 327.23 SLEEP MEDICINE REFERRAL   4. Acquired hypothyroidism  E03.9 244.9 levothyroxine (SYNTHROID) 137 mcg tablet     Diagnoses and all orders for this visit:    1. Well woman exam with routine gynecological exam    2. Colon cancer screening  -     REFERRAL TO GASTROENTEROLOGY    3. AIDE (obstructive sleep apnea)  -     SLEEP MEDICINE REFERRAL    4. Acquired hypothyroidism  -     levothyroxine (SYNTHROID) 137 mcg tablet; Take 1 Tablet by mouth Daily (before breakfast). Follow-up and Dispositions    · Return in about 4 weeks (around 11/1/2021) for Follow up. I have discussed the diagnosis with the patient and the intended plan as seen in the above orders. Social history, medical history, and labs were reviewed. The patient has received an after-visit summary and questions were answered concerning future plans. I have discussed medication side effects and warnings with the patient as well.     MD KIRSTEN Dumont & SEAMUS KEITH [facility]  10/04/21

## 2021-10-12 ENCOUNTER — TELEPHONE (OUTPATIENT)
Dept: FAMILY MEDICINE CLINIC | Age: 56
End: 2021-10-12

## 2021-10-12 RX ORDER — METRONIDAZOLE 500 MG/1
500 TABLET ORAL 2 TIMES DAILY
Qty: 14 TABLET | Refills: 0 | Status: SHIPPED | OUTPATIENT
Start: 2021-10-12 | End: 2021-10-19

## 2021-10-12 NOTE — TELEPHONE ENCOUNTER
Called patient and discussed Pap Test.  Also noted BV and Trich, calling in antibiotic.     MD KIRSTEN Castellano & SEAMUS KEITH [facility]  10/12/21

## 2021-10-13 ENCOUNTER — TELEPHONE (OUTPATIENT)
Dept: FAMILY MEDICINE CLINIC | Age: 56
End: 2021-10-13

## 2021-10-18 ENCOUNTER — TELEPHONE (OUTPATIENT)
Dept: FAMILY MEDICINE CLINIC | Age: 56
End: 2021-10-18

## 2021-10-18 NOTE — TELEPHONE ENCOUNTER
Returned call to patient. She states that she is completing ABT course tomorrow and would like to know if Dr. Tatiana Garner would like for her to be retested for STD.

## 2021-10-19 NOTE — TELEPHONE ENCOUNTER
Returned call to patient. Advised per Dr. Guzman Raya:  \"For this particular infection she does not need retesting. \"    She verbalized understanding.

## 2021-11-01 ENCOUNTER — VIRTUAL VISIT (OUTPATIENT)
Dept: SLEEP MEDICINE | Age: 56
End: 2021-11-01

## 2021-11-01 DIAGNOSIS — G47.33 OSA (OBSTRUCTIVE SLEEP APNEA): Primary | ICD-10-CM

## 2021-11-04 ENCOUNTER — OFFICE VISIT (OUTPATIENT)
Dept: BEHAVIORAL/MENTAL HEALTH CLINIC | Age: 56
End: 2021-11-04
Payer: COMMERCIAL

## 2021-11-04 VITALS — BODY MASS INDEX: 29.99 KG/M2 | WEIGHT: 180.2 LBS

## 2021-11-04 DIAGNOSIS — G47.00 INSOMNIA, UNSPECIFIED TYPE: ICD-10-CM

## 2021-11-04 DIAGNOSIS — Z51.81 MEDICATION MONITORING ENCOUNTER: ICD-10-CM

## 2021-11-04 DIAGNOSIS — F41.1 GENERALIZED ANXIETY DISORDER: Primary | ICD-10-CM

## 2021-11-04 PROCEDURE — 99214 OFFICE O/P EST MOD 30 MIN: CPT | Performed by: NURSE PRACTITIONER

## 2021-11-04 RX ORDER — ZOLPIDEM TARTRATE 5 MG/1
5 TABLET ORAL
Qty: 30 TABLET | Refills: 1 | Status: SHIPPED | OUTPATIENT
Start: 2021-11-04 | End: 2021-12-04

## 2021-11-04 RX ORDER — BUSPIRONE HYDROCHLORIDE 5 MG/1
5 TABLET ORAL 2 TIMES DAILY
Qty: 180 TABLET | Refills: 0 | Status: SHIPPED | OUTPATIENT
Start: 2021-11-04 | End: 2022-02-02

## 2021-11-04 NOTE — PROGRESS NOTES
Martha Sanchez is a 55 y.o. female who presents today for the following:  Chief Complaint   Patient presents with    Follow-up     \"Not good. So much anxiety. \"    Anxiety    Bipolar    Depression       Allergies   Allergen Reactions    Ibuprofen Swelling       Current Outpatient Medications   Medication Sig    busPIRone (BUSPAR) 5 mg tablet Take 1 Tablet by mouth two (2) times a day for 90 days.  zolpidem (AMBIEN) 5 mg tablet Take 1 Tablet by mouth nightly as needed for Sleep for up to 30 days. Max Daily Amount: 5 mg.  levothyroxine (SYNTHROID) 137 mcg tablet Take 1 Tablet by mouth Daily (before breakfast).  ARIPiprazole (Abilify) 15 mg tablet Take 1 Tablet by mouth daily.  ergocalciferol (ERGOCALCIFEROL) 1,250 mcg (50,000 unit) capsule Take 1 Capsule by mouth every seven (7) days.  divalproex DR (DEPAKOTE) 500 mg tablet Take 1,000 mg by mouth two (2) times a day.  albuterol (PROVENTIL HFA, VENTOLIN HFA, PROAIR HFA) 90 mcg/actuation inhaler Take 2 Puffs by inhalation every four (4) hours as needed for Wheezing.  fluticasone propion-salmeteroL (Advair HFA) 230-21 mcg/actuation inhaler Take 2 Puffs by inhalation two (2) times a day.  montelukast (SINGULAIR) 10 mg tablet Take 1 Tablet by mouth daily.  atorvastatin (LIPITOR) 80 mg tablet Take 0.5 Tablets by mouth daily.  topiramate (TOPAMAX) 100 mg tablet Take 1 Tab by mouth daily.  tiZANidine (ZANAFLEX) 4 mg tablet Take 1 Tab by mouth three (3) times daily as needed for Muscle Spasm(s). No current facility-administered medications for this visit.        Past Medical History:   Diagnosis Date    Asthma     Hypothyroid     Psychotic disorder ([facility] [address]        Past Surgical History:   Procedure Laterality Date    HX BLADDER SUSPENSION      HX CARPAL TUNNEL RELEASE      x 3 on right hand    HX HYSTERECTOMY      No idea what was left behind, for fibroids    HX TUMOR REMOVAL      As a child, right side of neck       Family History   Problem Relation Age of Onset    Heart Attack Mother 54    Diabetes Mother     Hypertension Mother    [facility] Arthritis-rheumatoid Mother     Heart Attack Father 48    Seizures Father     Psychiatric Disorder Father     Other Sister         AIDS    Other Brother         blood clot       Social History     Socioeconomic History    Marital status: SINGLE     Spouse name: Not on file    Number of children: 2    Years of education: Not on file    Highest education level: GED or equivalent   Occupational History    Not on file   Tobacco Use    Smoking status: Current Every Day Smoker     Packs/day: 1.00     Types: Cigarettes    Smokeless tobacco: Never Used   Substance and Sexual Activity    Alcohol use: Yes    Drug use: Yes     Frequency: 7.0 times per week     Types: Marijuana    Sexual activity: Yes     Partners: Female, Male     Birth control/protection: Condom   Other Topics Concern    Not on file   Social History Narrative    Not on file     Social Determinants of Health     Financial Resource Strain: High Risk    Difficulty of Paying Living Expenses: Hard   Food Insecurity: No Food Insecurity    Worried About Running Out of Food in the Last Year: Never true    Endy of Food in the Last Year: Never true   Transportation Needs: No Transportation Needs    Lack of Transportation (Medical): No    Lack of Transportation (Non-Medical):  No   Physical Activity:     Days of Exercise per Week: Not on file    Minutes of Exercise per Session: Not on file   Stress:     Feeling of Stress : Not on file   Social Connections:     Frequency of Communication with Friends and Family: Not on file    Frequency of Social Gatherings with Friends and Family: Not on file    Attends Advent Services: Not on file    Active Member of Clubs or Organizations: Not on file    Attends Club or Organization Meetings: Not on file    Marital Status: Not on file   Intimate Partner Violence:     Fear of Current or Ex-Partner: Not on file    Emotionally Abused: Not on file    Physically Abused: Not on file    Sexually Abused: Not on file   Housing Stability:     Unable to Pay for Housing in the Last Year: Not on file    Number of Places Lived in the Last Year: Not on file    Unstable Housing in the Last Year: Not on file         Ms. Branden Cárdenas follows up in the clinic for bipolar depression. Patient last visited the clinic virtually January 15, 2021. She continues Depakote 500 mg tablet take 2 tablets twice daily and Abilify 15 mg tablet take 1 tablet daily. It is noted patient has history of seizure disorder. Patient also mentions recently she was prescribed hydroxyzine at the emergency department for anxiety which she reports is ineffective. Patient presents to the clinic unaccompanied, fully alert and oriented. Gait is stable. Mood is moderate to severely anxious and depressed. No suicidal/homicidal thinking noted, risk for suicide is low, protective factor-family in [place]. Patient reports having crying spells and increased anxiety since the loss of her friend in August.  She reports that she is not sleeping well and appetite is fluctuating. No psychotic symptoms observed or reported. She is requesting medication for anxiety and depression. Patient is also requesting medication to help sleep. She reports taking trazodone in the past which did not work well. She reports Ambien has worked well in the past and she has taken Xanax in the past for anxiety. She is now smoking up to 1 pack of cigarettes daily and she continues to smoke marijuana regularly. She denies alcohol use. Patient reports that she plans to move back to [place] sometime in the near future so she can be close to her family. Review of Systems   Psychiatric/Behavioral: Positive for depression. The patient is nervous/anxious and has insomnia. All other systems reviewed and are negative.         Visit Vitals  Wt 81.7 kg (180 lb 3.2 oz)   BMI 29.99 kg/m²     Physical Exam  Psychiatric:         Attention and Perception: Attention and perception normal.         Mood and Affect: Mood is anxious and depressed. Affect is tearful. Speech: Speech normal.         Behavior: Behavior normal. Behavior is cooperative. Thought Content: Thought content normal.         Cognition and Memory: Cognition and memory normal.         Judgment: Judgment normal.          Plan:    Discontinue hydroxyzine. Start BuSpar 5 mg take 1 tablet twice daily for depression and anxiety. Side effect profile discussed. Start Ambien 5 mg take 1 tablet at bedtime as needed for sleep. She may continue Abilify and Depakote as prescribed. It is noted patient has history of seizure disorder which she is prescribed Depakote. Follow-up with medical provider as appropriate. For emergencies-call 911 or go to the emergency department. Advised patient to avoid smoking, alcohol and drug use.

## 2021-11-18 RX ORDER — HYDROXYZINE PAMOATE 25 MG/1
CAPSULE ORAL
COMMUNITY
Start: 2021-10-08

## 2021-11-27 ENCOUNTER — HOSPITAL ENCOUNTER (EMERGENCY)
Age: 56
Discharge: HOME | End: 2021-11-27
Attending: STUDENT IN AN ORGANIZED HEALTH CARE EDUCATION/TRAINING PROGRAM
Payer: COMMERCIAL

## 2021-11-27 VITALS
HEIGHT: 65 IN | RESPIRATION RATE: 16 BRPM | WEIGHT: 180 LBS | DIASTOLIC BLOOD PRESSURE: 56 MMHG | BODY MASS INDEX: 29.99 KG/M2 | SYSTOLIC BLOOD PRESSURE: 116 MMHG | HEART RATE: 58 BPM

## 2021-11-27 DIAGNOSIS — M62.838 MUSCLE SPASM: ICD-10-CM

## 2021-11-27 DIAGNOSIS — S46.912A SHOULDER STRAIN, LEFT, INITIAL ENCOUNTER: Primary | ICD-10-CM

## 2021-11-27 PROCEDURE — 99281 EMR DPT VST MAYX REQ PHY/QHP: CPT

## 2021-11-27 RX ORDER — CYCLOBENZAPRINE HCL 10 MG
10 TABLET ORAL
Qty: 15 TABLET | Refills: 0 | Status: SHIPPED | OUTPATIENT
Start: 2021-11-27

## 2021-11-27 RX ORDER — METHYLPREDNISOLONE 4 MG/1
TABLET ORAL
Qty: 1 DOSE PACK | Refills: 0 | Status: SHIPPED | OUTPATIENT
Start: 2021-11-27

## 2021-11-27 NOTE — ED PROVIDER NOTES
This is a 60-year-old female who presents ambulatory to the emergency room with complaints of left shoulder and left elbow pain for approximately 2 weeks. Denies any injury to her left shoulder or left elbow. States she woke up with the pain that just has never gone away. States that she is having difficulty raising her arm above her shoulder, flexing and extending at the elbow. Denies any chest pain, shortness of breath, dizziness, nausea or vomiting, diaphoresis. Denies any fevers or chills. Has not taken any medication prior to arrival for her symptoms. There are no further complaints at this time.     Linda Avelar MD  Past Medical History:  No date: Asthma  No date: Hypothyroid  No date: Psychotic disorder [facility])  Past Surgical History:  No date: HX BLADDER SUSPENSION  No date: HX CARPAL TUNNEL RELEASE      Comment:  x 3 on right hand  No date: HX HYSTERECTOMY      Comment:  No idea what was left behind, for fibroids  No date: HX TUMOR REMOVAL      Comment:  As a child, right side of neck             Past Medical History:   Diagnosis Date    Asthma     Hypothyroid     Psychotic disorder ([facility] [address]        Past Surgical History:   Procedure Laterality Date    HX BLADDER SUSPENSION      HX CARPAL TUNNEL RELEASE      x 3 on right hand    HX HYSTERECTOMY      No idea what was left behind, for fibroids    HX TUMOR REMOVAL      As a child, right side of neck         Family History:   Problem Relation Age of Onset    Heart Attack Mother 54    Diabetes Mother     Hypertension Mother    [facility] Arthritis-rheumatoid Mother     Heart Attack Father 48    Seizures Father     Psychiatric Disorder Father     Other Sister         AIDS    Other Brother         blood clot       Social History     Socioeconomic History    Marital status: SINGLE     Spouse name: Not on file    Number of children: 2    Years of education: Not on file    Highest education level: GED or equivalent   Occupational History    Not on file   Tobacco Use    Smoking status: Current Every Day Smoker     Packs/day: 1.00     Types: Cigarettes    Smokeless tobacco: Never Used   Substance and Sexual Activity    Alcohol use: Yes    Drug use: Yes     Frequency: 7.0 times per week     Types: Marijuana    Sexual activity: Yes     Partners: Female, Male     Birth control/protection: Condom   Other Topics Concern    Not on file   Social History Narrative    Not on file     Social Determinants of Health     Financial Resource Strain: High Risk    Difficulty of Paying Living Expenses: Hard   Food Insecurity: No Food Insecurity    Worried About Running Out of Food in the Last Year: Never true    Endy of Food in the Last Year: Never true   Transportation Needs: No Transportation Needs    Lack of Transportation (Medical): No    Lack of Transportation (Non-Medical): No   Physical Activity:     Days of Exercise per Week: Not on file    Minutes of Exercise per Session: Not on file   Stress:     Feeling of Stress : Not on file   Social Connections:     Frequency of Communication with Friends and Family: Not on file    Frequency of Social Gatherings with Friends and Family: Not on file    Attends Mandaeism Services: Not on file    Active Member of [employer] or Organizations: Not on file    Attends Club or Organization Meetings: Not on file    Marital Status: Not on file   Intimate Partner Violence:     Fear of Current or Ex-Partner: Not on file    Emotionally Abused: Not on file    Physically Abused: Not on file    Sexually Abused: Not on file   Housing Stability:     Unable to Pay for Housing in the Last Year: Not on file    Number of [place] in the Last Year: Not on file    Unstable Housing in the Last Year: Not on file         ALLERGIES: Ibuprofen    Review of Systems   Constitutional: Negative for appetite change, chills, diaphoresis, fatigue and fever.    HENT: Negative for congestion, ear discharge, ear pain, sinus pressure, sinus pain, sore throat and trouble swallowing. Eyes: Negative for photophobia, pain, redness and visual disturbance. Respiratory: Negative for chest tightness, shortness of breath and wheezing. Cardiovascular: Negative for chest pain and palpitations. Gastrointestinal: Negative for abdominal distention, abdominal pain, nausea and vomiting. Endocrine: Negative. Genitourinary: Negative for difficulty urinating, flank pain, frequency and urgency. Musculoskeletal: Positive for arthralgias (left shoulder and left elbow). Negative for back pain, neck pain and neck stiffness. Skin: Negative for color change, pallor, rash and wound. Allergic/Immunologic: Negative. Neurological: Negative for dizziness, speech difficulty, weakness and headaches. Hematological: Does not bruise/bleed easily. Psychiatric/Behavioral: Negative for behavioral problems. The patient is not nervous/anxious. There were no vitals filed for this visit. Physical Exam  Vitals and nursing note reviewed. Constitutional:       General: She is not in acute distress. Appearance: Normal appearance. She is well-developed. She is not ill-appearing. HENT:      Head: Normocephalic and atraumatic. Right Ear: External ear normal.      Left Ear: External ear normal.      Nose: Nose normal.      Mouth/Throat:      Mouth: Mucous membranes are moist.   Eyes:      General:         Right eye: No discharge. Left eye: No discharge. Conjunctiva/sclera: Conjunctivae normal.      Pupils: Pupils are equal, round, and reactive to light. Neck:      Vascular: No JVD. Trachea: No tracheal deviation. Cardiovascular:      Rate and Rhythm: Regular rhythm. Bradycardia present. Pulses: Normal pulses. Heart sounds: Normal heart sounds. No murmur heard. No gallop. Pulmonary:      Effort: Pulmonary effort is normal. No respiratory distress. Breath sounds: Normal breath sounds. No wheezing or rales. Chest:      Chest wall: No tenderness. Abdominal:      General: Bowel sounds are normal. There is no distension. Palpations: Abdomen is soft. Tenderness: There is no abdominal tenderness. There is no guarding or rebound. Genitourinary:     Comments: Negative    Musculoskeletal:         General: Tenderness (left shoulder and left elbow) present. Normal range of motion. Cervical back: Normal range of motion and neck supple. Skin:     General: Skin is warm and dry. Capillary Refill: Capillary refill takes less than 2 seconds. Coloration: Skin is not pale. Findings: No erythema or rash. Neurological:      General: No focal deficit present. Mental Status: She is alert and oriented to person, place, and time. Motor: No weakness. Coordination: Coordination normal.   Psychiatric:         Mood and Affect: Mood normal.         Behavior: Behavior normal.         Thought Content: Thought content normal.         Judgment: Judgment normal.          MDM  Number of Diagnoses or Management Options  Muscle spasm: new and requires workup  Shoulder strain, left, initial encounter: new and requires workup  Diagnosis management comments: Differential diagnosis includes shoulder strain, muscle spasm, nerve pain and others. After physical assessment, no indication for imaging or laboratory data at this time. Low suspicion for any kind of cardiac issue. Appears to be more musculoskeletal in nature. Discharged home and follow-up with PCP. Follow-up with orthopedics as an outpatient. Return to the emergency room with worsening symptoms. Patient in agreement with plan of care. Labs Reviewed - No data to display  No results found. 1:08 PM  Pt has been reexamined. Pt has no new complaints, changes or physical findings. Care plan outlined and precautions discussed. All available results were reviewed with pt. All medications were reviewed with pt.  All of pt's questions and concerns were addressed. Pt agrees to F/U as instructed and agrees to return to ED upon further deterioration. Pt is ready to go home. Sugey Cardenas NP    Please note that this dictation was completed with Kiko, the computer voice recognition software. Quite often unanticipated grammatical, syntax, homophones, and other interpretive errors are inadvertently transcribed by the computer software. Please disregard these errors. Please excuse any errors that have escaped final proofreading. Thank you.     Procedures

## 2021-11-27 NOTE — Clinical Note
[address]  [facility] EMERGENCY DEPT  [address] [place] [address] [phone]    Work/School Note    Date: 11/27/2021    To Whom It May concern:    Damian May was seen and treated today in the emergency room by the following provider(s):  Attending Provider: Danilo Rojas DO  Nurse Practitioner: Rayne Larson NP. Damian May is excused from work/school on 11/27/21 and 11/28/21. She is medically clear to return to work/school on 11/29/2021.        Sincerely,          Sugey Cardenas NP

## 2021-11-27 NOTE — Clinical Note
[address]  [facility] EMERGENCY DEPT  [address] [facility]  [place] [address] [phone]    Work/School Note    Date: 11/27/2021    To Whom It May concern:    Joelene Homans was seen and treated today in the emergency room by the following provider(s):  Attending Provider: Jacque Ayala DO  Nurse Practitioner: Sara River NP. Joelene Homans is excused from work/school on 11/27/21 and 11/28/21. She is medically clear to return to work/school on 11/29/2021.        Sincerely,          Joycelyn Peacock NP

## 2021-11-27 NOTE — ED TRIAGE NOTES
Pt arrives with c/o of left shoulder and left elbow pain x 2 weeks. States pain occurs when bending and extending arm, and when attempting to raise arm above shoulder. Denies any recent injury. States that she woke up with pain two weeks ago. Denies chest pain, SOB, dizziness, cough, fever.

## 2022-03-18 PROBLEM — G89.29 CHRONIC BILATERAL LOW BACK PAIN WITHOUT SCIATICA: Status: ACTIVE | Noted: 2021-09-03

## 2022-03-18 PROBLEM — M54.50 CHRONIC BILATERAL LOW BACK PAIN WITHOUT SCIATICA: Status: ACTIVE | Noted: 2021-09-03

## 2022-03-18 PROBLEM — H91.93 BILATERAL HEARING LOSS: Status: ACTIVE | Noted: 2020-08-05

## 2022-03-18 PROBLEM — R00.1 BRADYCARDIA: Status: ACTIVE | Noted: 2020-08-05

## 2022-03-19 PROBLEM — F31.70 BIPOLAR DISORDER IN PARTIAL REMISSION (HCC): Status: ACTIVE | Noted: 2020-08-05

## 2022-03-19 PROBLEM — R56.9 SEIZURES (HCC): Status: ACTIVE | Noted: 2020-08-05

## 2022-03-19 PROBLEM — E03.9 ACQUIRED HYPOTHYROIDISM: Status: ACTIVE | Noted: 2020-08-05

## 2022-03-19 PROBLEM — G47.33 OBSTRUCTIVE SLEEP APNEA SYNDROME: Status: ACTIVE | Noted: 2020-08-05

## 2022-03-19 PROBLEM — G89.4 CHRONIC PAIN SYNDROME: Status: ACTIVE | Noted: 2020-08-05

## 2022-03-19 PROBLEM — D64.9 ANEMIA: Status: ACTIVE | Noted: 2020-08-05

## 2022-03-19 PROBLEM — F17.200 CURRENT EVERY DAY SMOKER: Status: ACTIVE | Noted: 2020-08-05

## 2022-03-20 PROBLEM — I00 RHEUMATIC ARTERITIS: Status: ACTIVE | Noted: 2020-08-05

## 2022-03-20 PROBLEM — E78.5 HYPERLIPIDEMIA: Status: ACTIVE | Noted: 2020-08-05

## 2022-03-20 PROBLEM — J45.40 MODERATE PERSISTENT ASTHMA WITHOUT COMPLICATION: Status: ACTIVE | Noted: 2020-08-05

## 2023-05-18 RX ORDER — HYDROXYZINE PAMOATE 25 MG/1
CAPSULE ORAL
COMMUNITY
Start: 2021-10-08

## 2023-05-18 RX ORDER — ATORVASTATIN CALCIUM 80 MG/1
40 TABLET, FILM COATED ORAL DAILY
COMMUNITY
Start: 2021-09-03

## 2023-05-18 RX ORDER — ERGOCALCIFEROL 1.25 MG/1
50000 CAPSULE ORAL
COMMUNITY
Start: 2021-09-22

## 2023-05-18 RX ORDER — MONTELUKAST SODIUM 10 MG/1
10 TABLET ORAL DAILY
COMMUNITY
Start: 2021-09-03

## 2023-05-18 RX ORDER — METHYLPREDNISOLONE 4 MG/1
TABLET ORAL
COMMUNITY
Start: 2021-11-27

## 2023-05-18 RX ORDER — ALBUTEROL SULFATE 90 UG/1
2 AEROSOL, METERED RESPIRATORY (INHALATION) EVERY 4 HOURS PRN
COMMUNITY
Start: 2021-09-03

## 2023-05-18 RX ORDER — LEVOTHYROXINE SODIUM 137 UG/1
137 TABLET ORAL
COMMUNITY
Start: 2021-10-04

## 2023-05-18 RX ORDER — FLUTICASONE PROPIONATE AND SALMETEROL XINAFOATE 230; 21 UG/1; UG/1
2 AEROSOL, METERED RESPIRATORY (INHALATION) 2 TIMES DAILY
COMMUNITY
Start: 2021-09-03

## 2023-05-18 RX ORDER — DIVALPROEX SODIUM 500 MG/1
1000 TABLET, DELAYED RELEASE ORAL 2 TIMES DAILY
COMMUNITY
Start: 2021-08-19

## 2023-05-18 RX ORDER — CYCLOBENZAPRINE HCL 10 MG
10 TABLET ORAL 3 TIMES DAILY PRN
COMMUNITY
Start: 2021-11-27

## 2023-05-18 RX ORDER — TOPIRAMATE 100 MG/1
100 TABLET, FILM COATED ORAL DAILY
COMMUNITY
Start: 2021-04-26

## 2023-05-18 RX ORDER — ARIPIPRAZOLE 15 MG/1
15 TABLET ORAL DAILY
COMMUNITY
Start: 2021-09-22